# Patient Record
Sex: MALE | Race: BLACK OR AFRICAN AMERICAN | NOT HISPANIC OR LATINO | Employment: OTHER | URBAN - METROPOLITAN AREA
[De-identification: names, ages, dates, MRNs, and addresses within clinical notes are randomized per-mention and may not be internally consistent; named-entity substitution may affect disease eponyms.]

---

## 2023-05-01 ENCOUNTER — APPOINTMENT (OUTPATIENT)
Dept: PRIMARY CARE | Facility: CLINIC | Age: 73
End: 2023-05-01
Payer: MEDICARE

## 2023-05-03 ENCOUNTER — PATIENT OUTREACH (OUTPATIENT)
Dept: PRIMARY CARE | Facility: CLINIC | Age: 73
End: 2023-05-03
Payer: MEDICARE

## 2023-05-03 ENCOUNTER — DOCUMENTATION (OUTPATIENT)
Dept: PRIMARY CARE | Facility: CLINIC | Age: 73
End: 2023-05-03
Payer: MEDICARE

## 2023-05-03 NOTE — PROGRESS NOTES
Discharge Facility: [facility]  Discharge Diagnosis: UTI bacteremia due to Klebsiella oxytoca  Admission Date: 4-28-23  Discharge Date: 5-2-23    PCP Appointment Date:Unable to reach pt. At this time. Message sent to [facility]  Specialist Appointment Date: Unknown   Hospital Encounter and Summary: Linked

## 2023-05-17 ENCOUNTER — PATIENT OUTREACH (OUTPATIENT)
Dept: PRIMARY CARE | Facility: CLINIC | Age: 73
End: 2023-05-17
Payer: MEDICARE

## 2023-05-17 NOTE — PROGRESS NOTES
"TCM complete.  Discharge date \" 5-2-23   2 attempts were made to reach patient to assess needs.   No return call as of this note.   If patient schedules follow up within 14 days of discharge, visit is TCM billable.  Message sent to practice clinical pool to reach out to patient and schedule an appointment within 7-13 days from discharge date.    If patient meets criteria for moderately complex & has follow-up within 14 days-can bill [id number].   If patient meets criteria for highly complex & has follow-up visit within 7 days-can bill [id number].    *virtual follow up needs modifier added (95 or GT)   *AWV AND TCM CAN BE BILLED TOGETHER WITH 25 MODIFIER   Enrique Garcia LPN    "

## 2023-07-11 PROBLEM — H52.4 BILATERAL PRESBYOPIA: Status: ACTIVE | Noted: 2023-07-11

## 2023-07-11 PROBLEM — J18.1: Status: ACTIVE | Noted: 2023-07-11

## 2023-07-11 PROBLEM — R94.31 ABNORMAL EKG: Status: ACTIVE | Noted: 2023-07-11

## 2023-07-11 PROBLEM — R91.1 LUNG NODULE: Status: ACTIVE | Noted: 2023-07-11

## 2023-07-11 PROBLEM — I10 BENIGN ESSENTIAL HYPERTENSION: Status: ACTIVE | Noted: 2023-07-11

## 2023-07-11 PROBLEM — H52.03 HYPERMETROPIA OF BOTH EYES: Status: ACTIVE | Noted: 2023-07-11

## 2023-07-11 PROBLEM — R00.0 TACHYCARDIA: Status: ACTIVE | Noted: 2023-07-11

## 2023-07-11 PROBLEM — I09.9 RHEUMATIC HEART DISEASE: Status: ACTIVE | Noted: 2023-07-11

## 2023-07-11 PROBLEM — F32.5 DEPRESSION, MAJOR, IN REMISSION (CMS-HCC): Status: ACTIVE | Noted: 2023-07-11

## 2023-07-11 PROBLEM — N39.0 UTI (URINARY TRACT INFECTION): Status: RESOLVED | Noted: 2023-07-11 | Resolved: 2023-07-11

## 2023-07-11 PROBLEM — R30.0 DYSURIA: Status: RESOLVED | Noted: 2023-07-11 | Resolved: 2023-07-11

## 2023-07-11 PROBLEM — E66.3 OVERWEIGHT (BMI 25.0-29.9): Status: ACTIVE | Noted: 2023-07-11

## 2023-07-11 PROBLEM — F51.02 ADJUSTMENT INSOMNIA: Status: ACTIVE | Noted: 2023-07-11

## 2023-07-11 PROBLEM — H43.812 POSTERIOR VITREOUS DETACHMENT OF LEFT EYE: Status: ACTIVE | Noted: 2023-07-11

## 2023-07-11 PROBLEM — H40.001 GLAUCOMA SUSPECT OF RIGHT EYE: Status: ACTIVE | Noted: 2023-07-11

## 2023-07-11 PROBLEM — N52.9 MALE ERECTILE DISORDER: Status: ACTIVE | Noted: 2023-07-11

## 2023-07-11 PROBLEM — E78.5 HYPERLIPEMIA: Status: ACTIVE | Noted: 2023-07-11

## 2023-07-11 PROBLEM — N17.9 AKI (ACUTE KIDNEY INJURY) (CMS-HCC): Status: ACTIVE | Noted: 2023-07-11

## 2023-07-11 PROBLEM — R97.20 ELEVATED PSA: Status: ACTIVE | Noted: 2023-07-11

## 2023-07-11 PROBLEM — N28.1 COMPLEX RENAL CYST: Status: ACTIVE | Noted: 2023-07-11

## 2023-07-11 PROBLEM — H52.203 ASTIGMATISM, BILATERAL: Status: ACTIVE | Noted: 2023-07-11

## 2023-07-11 PROBLEM — R73.01 ELEVATED FASTING BLOOD SUGAR: Status: ACTIVE | Noted: 2023-07-11

## 2023-07-11 RX ORDER — METOPROLOL SUCCINATE 50 MG/1
1 TABLET, EXTENDED RELEASE ORAL DAILY
COMMUNITY
Start: 2022-03-01 | End: 2023-07-14 | Stop reason: SDUPTHER

## 2023-07-11 RX ORDER — TADALAFIL 20 MG/1
TABLET ORAL
COMMUNITY
End: 2023-07-12 | Stop reason: SDUPTHER

## 2023-07-11 RX ORDER — PENICILLIN V POTASSIUM 500 MG/1
1 TABLET, FILM COATED ORAL DAILY
COMMUNITY
End: 2023-07-14 | Stop reason: SDUPTHER

## 2023-07-11 RX ORDER — ROSUVASTATIN CALCIUM 10 MG/1
1 TABLET, COATED ORAL NIGHTLY
COMMUNITY
Start: 2021-08-18 | End: 2023-07-14 | Stop reason: SDUPTHER

## 2023-07-11 RX ORDER — HYDRALAZINE HYDROCHLORIDE 50 MG/1
1 TABLET, FILM COATED ORAL 2 TIMES DAILY
COMMUNITY
End: 2023-07-14 | Stop reason: SDUPTHER

## 2023-07-11 RX ORDER — LOSARTAN POTASSIUM 100 MG/1
1 TABLET ORAL DAILY
COMMUNITY
Start: 2018-07-24 | End: 2023-07-14 | Stop reason: SDUPTHER

## 2023-07-11 RX ORDER — TRIAMTERENE AND HYDROCHLOROTHIAZIDE 37.5; 25 MG/1; MG/1
1 CAPSULE ORAL DAILY
COMMUNITY
End: 2023-07-14 | Stop reason: SDUPTHER

## 2023-07-12 ENCOUNTER — OFFICE VISIT (OUTPATIENT)
Dept: PRIMARY CARE | Facility: CLINIC | Age: 73
End: 2023-07-12
Payer: MEDICARE

## 2023-07-12 VITALS
SYSTOLIC BLOOD PRESSURE: 111 MMHG | TEMPERATURE: 97.2 F | WEIGHT: 192.8 LBS | DIASTOLIC BLOOD PRESSURE: 77 MMHG | BODY MASS INDEX: 26.11 KG/M2 | HEIGHT: 72 IN

## 2023-07-12 DIAGNOSIS — N40.0 ENLARGED PROSTATE: ICD-10-CM

## 2023-07-12 DIAGNOSIS — I10 BENIGN ESSENTIAL HYPERTENSION: Primary | ICD-10-CM

## 2023-07-12 DIAGNOSIS — E78.2 MIXED HYPERLIPIDEMIA: ICD-10-CM

## 2023-07-12 DIAGNOSIS — I09.9 RHEUMATIC HEART DISEASE: ICD-10-CM

## 2023-07-12 DIAGNOSIS — R73.01 ELEVATED FASTING BLOOD SUGAR: ICD-10-CM

## 2023-07-12 PROBLEM — F32.5 DEPRESSION, MAJOR, IN REMISSION (CMS-HCC): Status: RESOLVED | Noted: 2023-07-11 | Resolved: 2023-07-12

## 2023-07-12 PROCEDURE — 3074F SYST BP LT 130 MM HG: CPT | Performed by: NURSE PRACTITIONER

## 2023-07-12 PROCEDURE — 99214 OFFICE O/P EST MOD 30 MIN: CPT | Performed by: NURSE PRACTITIONER

## 2023-07-12 PROCEDURE — 1036F TOBACCO NON-USER: CPT | Performed by: NURSE PRACTITIONER

## 2023-07-12 PROCEDURE — 1159F MED LIST DOCD IN RCRD: CPT | Performed by: NURSE PRACTITIONER

## 2023-07-12 PROCEDURE — 1160F RVW MEDS BY RX/DR IN RCRD: CPT | Performed by: NURSE PRACTITIONER

## 2023-07-12 PROCEDURE — 3078F DIAST BP <80 MM HG: CPT | Performed by: NURSE PRACTITIONER

## 2023-07-12 RX ORDER — TADALAFIL 20 MG/1
20 TABLET ORAL DAILY PRN
Qty: 30 TABLET | Refills: 1 | Status: SHIPPED | OUTPATIENT
Start: 2023-07-12 | End: 2024-02-09 | Stop reason: ALTCHOICE

## 2023-07-12 RX ORDER — TAMSULOSIN HYDROCHLORIDE 0.4 MG/1
0.4 CAPSULE ORAL DAILY
Qty: 30 CAPSULE | Refills: 0 | Status: SHIPPED
Start: 2023-07-12 | End: 2024-02-09 | Stop reason: ALTCHOICE

## 2023-07-12 ASSESSMENT — ENCOUNTER SYMPTOMS
CARDIOVASCULAR NEGATIVE: 1
PSYCHIATRIC NEGATIVE: 1
CONSTITUTIONAL NEGATIVE: 1
RESPIRATORY NEGATIVE: 1
GASTROINTESTINAL NEGATIVE: 1

## 2023-07-12 ASSESSMENT — PATIENT HEALTH QUESTIONNAIRE - PHQ9
2. FEELING DOWN, DEPRESSED OR HOPELESS: NOT AT ALL
1. LITTLE INTEREST OR PLEASURE IN DOING THINGS: NOT AT ALL
SUM OF ALL RESPONSES TO PHQ9 QUESTIONS 1 AND 2: 0

## 2023-07-12 NOTE — PROGRESS NOTES
Subjective   Patient ID: Franky Cai is a 72 y.o. male who presents for Follow-up.    HPI Presents today for follow up and medication renewal.  Tolerates medication well.  Was hospitalized 4/28/2023 to 5/2/2023 for urinary retention, prostatitis and sepsis.  He had noticed his urinary stream reduced and then became delusional.  Went to urgent care and then [facility].  Spent the 4 days in the hospital on Iv antibiotics.  Prostate biopsy negative per patient     Was given tamsulosin for 30 days after for stream which helped , has a follow up with urology 8/2/2023  /77 this morning.   Blood work reviewed 2022 and 2023.   Cardiology visit 3/1/2023 reviewed as noted  Requesting written prescription for Cialis today    Review of Systems   Constitutional: Negative.    Respiratory: Negative.     Cardiovascular: Negative.    Gastrointestinal: Negative.    Genitourinary:         As noted in HPI     Psychiatric/Behavioral: Negative.         Objective   BP (!) 144/92   Temp 36.2 °C (97.2 °F)   Ht 1.829 m (6')   Wt 87.5 kg (192 lb 12.8 oz)   BMI 26.15 kg/m²     Physical Exam  Constitutional:       Appearance: Normal appearance.   Cardiovascular:      Rate and Rhythm: Normal rate and regular rhythm.   Pulmonary:      Effort: Pulmonary effort is normal.      Breath sounds: Normal breath sounds.   Abdominal:      General: Abdomen is flat. Bowel sounds are normal.      Palpations: Abdomen is soft.   Musculoskeletal:         General: Normal range of motion.   Neurological:      General: No focal deficit present.      Mental Status: He is alert.   Psychiatric:         Mood and Affect: Mood normal.         Behavior: Behavior normal.         Assessment/Plan   Problem List Items Addressed This Visit          Cardiac and Vasculature    Benign essential hypertension - Primary    Relevant Orders    Comprehensive Metabolic Panel    CBC and Auto Differential    Hyperlipemia    Relevant Orders    Lipid Panel    Rheumatic heart  disease       Endocrine/Metabolic    Elevated fasting blood sugar    Relevant Orders    Hemoglobin A1C     Other Visit Diagnoses       Enlarged prostate        Relevant Medications    tamsulosin (Flomax) 0.4 mg 24 hr capsule    tadalafil 20 mg tablet    Other Relevant Orders    Prostate Specific Antigen, Screen

## 2023-07-12 NOTE — PATIENT INSTRUCTIONS
I will refill medications after the blood work.   I restart tamsulosin, follow up with urology next month.   Follow up in 6 months and as needed

## 2023-07-14 ENCOUNTER — LAB (OUTPATIENT)
Dept: LAB | Facility: LAB | Age: 73
End: 2023-07-14
Payer: MEDICARE

## 2023-07-14 DIAGNOSIS — I10 BENIGN ESSENTIAL HYPERTENSION: Primary | ICD-10-CM

## 2023-07-14 DIAGNOSIS — I10 BENIGN ESSENTIAL HYPERTENSION: ICD-10-CM

## 2023-07-14 DIAGNOSIS — N40.0 ENLARGED PROSTATE: ICD-10-CM

## 2023-07-14 DIAGNOSIS — I09.9 RHEUMATIC HEART DISEASE: ICD-10-CM

## 2023-07-14 DIAGNOSIS — R73.01 ELEVATED FASTING BLOOD SUGAR: ICD-10-CM

## 2023-07-14 DIAGNOSIS — E78.2 MIXED HYPERLIPIDEMIA: ICD-10-CM

## 2023-07-14 LAB
ALANINE AMINOTRANSFERASE (SGPT) (U/L) IN SER/PLAS: 18 U/L (ref 10–52)
ALBUMIN (G/DL) IN SER/PLAS: 4.1 G/DL (ref 3.4–5)
ALKALINE PHOSPHATASE (U/L) IN SER/PLAS: 62 U/L (ref 33–136)
ANION GAP IN SER/PLAS: 11 MMOL/L (ref 10–20)
ASPARTATE AMINOTRANSFERASE (SGOT) (U/L) IN SER/PLAS: 18 U/L (ref 9–39)
BASOPHILS (10*3/UL) IN BLOOD BY AUTOMATED COUNT: 0.05 X10E9/L (ref 0–0.1)
BASOPHILS/100 LEUKOCYTES IN BLOOD BY AUTOMATED COUNT: 0.5 % (ref 0–2)
BILIRUBIN TOTAL (MG/DL) IN SER/PLAS: 0.6 MG/DL (ref 0–1.2)
CALCIUM (MG/DL) IN SER/PLAS: 10 MG/DL (ref 8.6–10.3)
CARBON DIOXIDE, TOTAL (MMOL/L) IN SER/PLAS: 26 MMOL/L (ref 21–32)
CHLORIDE (MMOL/L) IN SER/PLAS: 105 MMOL/L (ref 98–107)
CHOLESTEROL (MG/DL) IN SER/PLAS: 182 MG/DL (ref 0–199)
CHOLESTEROL IN HDL (MG/DL) IN SER/PLAS: 46.9 MG/DL
CHOLESTEROL/HDL RATIO: 3.9
CREATININE (MG/DL) IN SER/PLAS: 1.49 MG/DL (ref 0.5–1.3)
EOSINOPHILS (10*3/UL) IN BLOOD BY AUTOMATED COUNT: 0.28 X10E9/L (ref 0–0.4)
EOSINOPHILS/100 LEUKOCYTES IN BLOOD BY AUTOMATED COUNT: 3 % (ref 0–6)
ERYTHROCYTE DISTRIBUTION WIDTH (RATIO) BY AUTOMATED COUNT: 16 % (ref 11.5–14.5)
ERYTHROCYTE MEAN CORPUSCULAR HEMOGLOBIN CONCENTRATION (G/DL) BY AUTOMATED: 31.9 G/DL (ref 32–36)
ERYTHROCYTE MEAN CORPUSCULAR VOLUME (FL) BY AUTOMATED COUNT: 87 FL (ref 80–100)
ERYTHROCYTES (10*6/UL) IN BLOOD BY AUTOMATED COUNT: 5.49 X10E12/L (ref 4.5–5.9)
ESTIMATED AVERAGE GLUCOSE FOR HBA1C: 134 MG/DL
GFR MALE: 50 ML/MIN/1.73M2
GLUCOSE (MG/DL) IN SER/PLAS: 100 MG/DL (ref 74–99)
HEMATOCRIT (%) IN BLOOD BY AUTOMATED COUNT: 47.7 % (ref 41–52)
HEMOGLOBIN (G/DL) IN BLOOD: 15.2 G/DL (ref 13.5–17.5)
HEMOGLOBIN A1C/HEMOGLOBIN TOTAL IN BLOOD: 6.3 %
IMMATURE GRANULOCYTES/100 LEUKOCYTES IN BLOOD BY AUTOMATED COUNT: 0.2 % (ref 0–0.9)
LDL: 83 MG/DL (ref 0–99)
LEUKOCYTES (10*3/UL) IN BLOOD BY AUTOMATED COUNT: 9.4 X10E9/L (ref 4.4–11.3)
LYMPHOCYTES (10*3/UL) IN BLOOD BY AUTOMATED COUNT: 4.07 X10E9/L (ref 0.8–3)
LYMPHOCYTES/100 LEUKOCYTES IN BLOOD BY AUTOMATED COUNT: 43.1 % (ref 13–44)
MONOCYTES (10*3/UL) IN BLOOD BY AUTOMATED COUNT: 0.79 X10E9/L (ref 0.05–0.8)
MONOCYTES/100 LEUKOCYTES IN BLOOD BY AUTOMATED COUNT: 8.4 % (ref 2–10)
NEUTROPHILS (10*3/UL) IN BLOOD BY AUTOMATED COUNT: 4.23 X10E9/L (ref 1.6–5.5)
NEUTROPHILS/100 LEUKOCYTES IN BLOOD BY AUTOMATED COUNT: 44.8 % (ref 40–80)
NON HDL CHOLESTEROL: 135 MG/DL
PLATELETS (10*3/UL) IN BLOOD AUTOMATED COUNT: 316 X10E9/L (ref 150–450)
POTASSIUM (MMOL/L) IN SER/PLAS: 3.8 MMOL/L (ref 3.5–5.3)
PROSTATE SPECIFIC ANTIGEN,SCREEN: 35.16 NG/ML (ref 0–4)
PROTEIN TOTAL: 7.3 G/DL (ref 6.4–8.2)
SODIUM (MMOL/L) IN SER/PLAS: 138 MMOL/L (ref 136–145)
TRIGLYCERIDE (MG/DL) IN SER/PLAS: 262 MG/DL (ref 0–149)
UREA NITROGEN (MG/DL) IN SER/PLAS: 31 MG/DL (ref 6–23)
VLDL: 52 MG/DL (ref 0–40)

## 2023-07-14 PROCEDURE — 80061 LIPID PANEL: CPT

## 2023-07-14 PROCEDURE — 85025 COMPLETE CBC W/AUTO DIFF WBC: CPT

## 2023-07-14 PROCEDURE — 84153 ASSAY OF PSA TOTAL: CPT

## 2023-07-14 PROCEDURE — 83036 HEMOGLOBIN GLYCOSYLATED A1C: CPT

## 2023-07-14 PROCEDURE — 36415 COLL VENOUS BLD VENIPUNCTURE: CPT

## 2023-07-14 PROCEDURE — 80053 COMPREHEN METABOLIC PANEL: CPT

## 2023-07-14 RX ORDER — HYDRALAZINE HYDROCHLORIDE 50 MG/1
50 TABLET, FILM COATED ORAL 2 TIMES DAILY
Qty: 90 TABLET | Refills: 1 | Status: SHIPPED | OUTPATIENT
Start: 2023-07-14 | End: 2023-08-01 | Stop reason: SDUPTHER

## 2023-07-14 RX ORDER — METOPROLOL SUCCINATE 50 MG/1
50 TABLET, EXTENDED RELEASE ORAL DAILY
Qty: 90 TABLET | Refills: 1 | Status: SHIPPED | OUTPATIENT
Start: 2023-07-14 | End: 2023-08-01 | Stop reason: SDUPTHER

## 2023-07-14 RX ORDER — LOSARTAN POTASSIUM 100 MG/1
100 TABLET ORAL DAILY
Qty: 90 TABLET | Refills: 1 | Status: SHIPPED | OUTPATIENT
Start: 2023-07-14 | End: 2023-08-01 | Stop reason: SDUPTHER

## 2023-07-14 RX ORDER — ROSUVASTATIN CALCIUM 10 MG/1
10 TABLET, COATED ORAL NIGHTLY
Qty: 90 TABLET | Refills: 3 | Status: SHIPPED | OUTPATIENT
Start: 2023-07-14 | End: 2023-08-01 | Stop reason: SDUPTHER

## 2023-07-14 RX ORDER — PENICILLIN V POTASSIUM 500 MG/1
500 TABLET, FILM COATED ORAL DAILY
Qty: 90 TABLET | Refills: 1 | Status: SHIPPED | OUTPATIENT
Start: 2023-07-14 | End: 2023-08-01 | Stop reason: SDUPTHER

## 2023-07-14 RX ORDER — TRIAMTERENE AND HYDROCHLOROTHIAZIDE 37.5; 25 MG/1; MG/1
1 CAPSULE ORAL DAILY
Qty: 90 CAPSULE | Refills: 1 | Status: SHIPPED | OUTPATIENT
Start: 2023-07-14 | End: 2023-08-01 | Stop reason: SDUPTHER

## 2023-07-17 ENCOUNTER — TELEPHONE (OUTPATIENT)
Dept: PRIMARY CARE | Facility: CLINIC | Age: 73
End: 2023-07-17
Payer: MEDICARE

## 2023-07-17 NOTE — TELEPHONE ENCOUNTER
----- Message from ANNA Gonzalez sent at 7/14/2023  2:16 PM EDT -----  Regarding: Blood work  Let know blood work looks ok.  Blood sugar is up just a bit and PSA is 35.  I now he has an appointment with urology in August so Dr DONALDSON will address the PSA.  Please work on limiting simple sugars in your diet to reduce the sugar.  WG  ----- Message -----  From: Lab, Background User  Sent: 7/14/2023  12:18 PM EDT  To: ANNA Gonzalez

## 2023-07-28 DIAGNOSIS — E78.2 MIXED HYPERLIPIDEMIA: ICD-10-CM

## 2023-07-28 DIAGNOSIS — I10 BENIGN ESSENTIAL HYPERTENSION: ICD-10-CM

## 2023-07-28 DIAGNOSIS — I09.9 RHEUMATIC HEART DISEASE: ICD-10-CM

## 2023-07-31 RX ORDER — ROSUVASTATIN CALCIUM 10 MG/1
10 TABLET, COATED ORAL NIGHTLY
Qty: 90 TABLET | Refills: 1 | OUTPATIENT
Start: 2023-07-31

## 2023-07-31 RX ORDER — TRIAMTERENE AND HYDROCHLOROTHIAZIDE 37.5; 25 MG/1; MG/1
1 CAPSULE ORAL DAILY
Qty: 90 CAPSULE | Refills: 1 | OUTPATIENT
Start: 2023-07-31

## 2023-07-31 RX ORDER — HYDRALAZINE HYDROCHLORIDE 50 MG/1
50 TABLET, FILM COATED ORAL 2 TIMES DAILY
Qty: 180 TABLET | Refills: 1 | OUTPATIENT
Start: 2023-07-31

## 2023-07-31 RX ORDER — LOSARTAN POTASSIUM 100 MG/1
100 TABLET ORAL DAILY
Qty: 90 TABLET | Refills: 1 | OUTPATIENT
Start: 2023-07-31

## 2023-07-31 NOTE — TELEPHONE ENCOUNTER
Called pt 7/31/23 and left VM to call the pharmacy because he should have enough medication with a refill.

## 2023-08-01 RX ORDER — TRIAMTERENE AND HYDROCHLOROTHIAZIDE 37.5; 25 MG/1; MG/1
1 CAPSULE ORAL DAILY
Qty: 90 CAPSULE | Refills: 1 | Status: SHIPPED | OUTPATIENT
Start: 2023-08-01 | End: 2024-01-29 | Stop reason: SDUPTHER

## 2023-08-01 RX ORDER — HYDRALAZINE HYDROCHLORIDE 50 MG/1
50 TABLET, FILM COATED ORAL 2 TIMES DAILY
Qty: 90 TABLET | Refills: 1 | Status: SHIPPED | OUTPATIENT
Start: 2023-08-01 | End: 2023-12-11

## 2023-08-01 RX ORDER — PENICILLIN V POTASSIUM 500 MG/1
500 TABLET, FILM COATED ORAL DAILY
Qty: 90 TABLET | Refills: 1 | Status: SHIPPED
Start: 2023-08-01 | End: 2024-02-09 | Stop reason: ALTCHOICE

## 2023-08-01 RX ORDER — METOPROLOL SUCCINATE 50 MG/1
50 TABLET, EXTENDED RELEASE ORAL DAILY
Qty: 90 TABLET | Refills: 1 | Status: SHIPPED | OUTPATIENT
Start: 2023-08-01 | End: 2024-02-06

## 2023-08-01 RX ORDER — ROSUVASTATIN CALCIUM 10 MG/1
10 TABLET, COATED ORAL NIGHTLY
Qty: 90 TABLET | Refills: 3 | Status: SHIPPED | OUTPATIENT
Start: 2023-08-01 | End: 2024-06-05

## 2023-08-01 RX ORDER — LOSARTAN POTASSIUM 100 MG/1
100 TABLET ORAL DAILY
Qty: 90 TABLET | Refills: 1 | Status: SHIPPED | OUTPATIENT
Start: 2023-08-01 | End: 2024-02-06

## 2023-08-03 LAB
CREATININE (MG/DL) IN SER/PLAS: 1.76 MG/DL (ref 0.5–1.3)
GFR MALE: 41 ML/MIN/1.73M2
PROSTATE SPECIFIC AG (NG/ML) IN SER/PLAS: 33.96 NG/ML (ref 0–4)

## 2023-10-18 ENCOUNTER — APPOINTMENT (OUTPATIENT)
Dept: UROLOGY | Facility: CLINIC | Age: 73
End: 2023-10-18
Payer: MEDICARE

## 2023-10-20 ENCOUNTER — APPOINTMENT (OUTPATIENT)
Dept: UROLOGY | Facility: CLINIC | Age: 73
End: 2023-10-20
Payer: MEDICARE

## 2023-11-21 ENCOUNTER — OFFICE VISIT (OUTPATIENT)
Dept: UROLOGY | Facility: CLINIC | Age: 73
End: 2023-11-21
Payer: MEDICARE

## 2023-11-21 VITALS — WEIGHT: 197.6 LBS | HEIGHT: 72 IN | BODY MASS INDEX: 26.76 KG/M2 | TEMPERATURE: 95.5 F

## 2023-11-21 DIAGNOSIS — N39.0 RECURRENT UTI: ICD-10-CM

## 2023-11-21 DIAGNOSIS — R97.20 ELEVATED PSA: ICD-10-CM

## 2023-11-21 DIAGNOSIS — N28.89 RENAL MASS: ICD-10-CM

## 2023-11-21 LAB
POC APPEARANCE, URINE: CLEAR
POC BILIRUBIN, URINE: ABNORMAL
POC BLOOD, URINE: NEGATIVE
POC COLOR, URINE: YELLOW
POC GLUCOSE, URINE: NEGATIVE MG/DL
POC KETONES, URINE: ABNORMAL MG/DL
POC LEUKOCYTES, URINE: NEGATIVE
POC NITRITE,URINE: NEGATIVE
POC PH, URINE: 5.5 PH
POC PROTEIN, URINE: ABNORMAL MG/DL
POC SPECIFIC GRAVITY, URINE: 1.02
POC UROBILINOGEN, URINE: 0.2 EU/DL

## 2023-11-21 PROCEDURE — 1036F TOBACCO NON-USER: CPT | Performed by: UROLOGY

## 2023-11-21 PROCEDURE — 51798 US URINE CAPACITY MEASURE: CPT | Performed by: UROLOGY

## 2023-11-21 PROCEDURE — 1159F MED LIST DOCD IN RCRD: CPT | Performed by: UROLOGY

## 2023-11-21 PROCEDURE — 1160F RVW MEDS BY RX/DR IN RCRD: CPT | Performed by: UROLOGY

## 2023-11-21 PROCEDURE — 81003 URINALYSIS AUTO W/O SCOPE: CPT | Performed by: UROLOGY

## 2023-11-21 PROCEDURE — 1126F AMNT PAIN NOTED NONE PRSNT: CPT | Performed by: UROLOGY

## 2023-11-21 PROCEDURE — 99204 OFFICE O/P NEW MOD 45 MIN: CPT | Performed by: UROLOGY

## 2023-11-21 ASSESSMENT — PAIN SCALES - GENERAL: PAINLEVEL: 0-NO PAIN

## 2023-11-21 NOTE — PROGRESS NOTES
Subjective   Franky Cai is a 72 y.o. male who presents as a new patient kindly referred by Dr. Arreguin due to elevated PSA s/p negative prostate biopsy. Patient was admitted to [facility] 5/2023 due to UTI secondary to klebsiella and bacteremia. PSA aden significantly on admission, has come back was 33.96 on 8/2023. He had a renal scan done which showed right-sided renal mass.  He has nocturia x1. No other bothersome urinary symptoms.  He denies flank pain, gross hematuria, kidney stones, and recurrent UTI.    Objective   Past Medical History:   Diagnosis Date    Acne, unspecified 02/19/2013    Acne    Cutaneous abscess of face 02/19/2013    Abscess of face    Inflammatory disease of prostate, unspecified 02/19/2013    Prostatitis    Other conditions influencing health status 02/19/2013    Infective Myositis    Other conditions influencing health status 02/19/2013    Familial Combined Hyperlipidemia    Rheumatic heart disease, unspecified 07/19/2022    Rheumatic heart disease    UTI (urinary tract infection) 07/11/2023     Past Surgical History:   Procedure Laterality Date    OTHER SURGICAL HISTORY  09/16/2022    No history of surgery     Current Outpatient Medications on File Prior to Visit   Medication Sig Dispense Refill    aspirin 81 mg capsule Take 1 tablet by mouth once daily.      hydrALAZINE (Apresoline) 50 mg tablet Take 1 tablet (50 mg) by mouth 2 times a day. 90 tablet 1    losartan (Cozaar) 100 mg tablet Take 1 tablet (100 mg) by mouth once daily. 90 tablet 1    metoprolol succinate XL (Toprol-XL) 50 mg 24 hr tablet Take 1 tablet (50 mg) by mouth once daily. 90 tablet 1    rosuvastatin (Crestor) 10 mg tablet Take 1 tablet (10 mg) by mouth once daily at bedtime. 90 tablet 3    penicillin v potassium (Veetid) 500 mg tablet Take 1 tablet (500 mg) by mouth once daily. (Patient not taking: Reported on 11/21/2023) 90 tablet 1    tadalafil 20 mg tablet Take 1 tablet (20 mg) by mouth once daily  as needed for erectile dysfunction. (Patient not taking: Reported on 11/21/2023) 30 tablet 1    tamsulosin (Flomax) 0.4 mg 24 hr capsule Take 1 capsule (0.4 mg) by mouth once daily. (Patient not taking: Reported on 11/21/2023) 30 capsule 0    triamterene-hydrochlorothiazid (Dyazide) 37.5-25 mg capsule Take 1 capsule by mouth once daily. (Patient not taking: Reported on 11/21/2023) 90 capsule 1     No current facility-administered medications on file prior to visit.     No Known Allergies    Temp 35.3 °C (95.5 °F)   Ht 1.829 m (6')   Wt 89.6 kg (197 lb 9.6 oz)   BMI 26.80 kg/m²   Physical Exam    Lab Review  Urine analysis shows negative   Lab Results   Component Value Date    PSA 33.96 (H) 08/03/2023     PVR 0ml  IPSS 3 and 1    Assessment/Plan   Diagnoses and all orders for this visit:  Renal mass  -     POCT UA Automated manually resulted  Recurrent UTI  -     Measure post void residual  Elevated PSA    Renal Mass     I reviewed CT of kidneys from 8/1/2023 which shows a solid enhancing 2 cm lesion upper portion left kidney is  compatible with neoplasm, indeterminate subtype.Multiple additional renal lesions more likely benign.       We discussed treatment options for enhancing renal mass which include renal biopsy vs. percutaneous ablation vs. partial nephrectomy vs. radical nephrectomy.      Patient will follow up with Dr. Mccormick for further management and evaluation.     2. UTI     Resolved, patient had no prior episodes to hospitalization. He has no bothersome urinary symptoms. PVR 0    We will continue to monitor.     3. Elevated PSA s/p negative biopsy     We will proceed with prostate MRI under sedation due to patients claustrophobia.       All questions were answered to the patient's satisfaction. Patient agrees with the plan and wishes to proceed. Follow-up will be scheduled appropriately.     Scribed for Dr. Watters by Mackenzie Neely. I , Dr Watters, have personally reviewed and agreed with the information  entered by the Virtual Scribe.

## 2023-12-06 PROBLEM — Q23.81 BICUSPID AORTIC VALVE: Status: ACTIVE | Noted: 2023-12-06

## 2023-12-06 PROBLEM — N28.89 RENAL MASS: Status: ACTIVE | Noted: 2023-12-06

## 2023-12-06 PROBLEM — Q23.1 BICUSPID AORTIC VALVE (HHS-HCC): Status: ACTIVE | Noted: 2023-12-06

## 2023-12-06 PROBLEM — I47.10 SUPRAVENTRICULAR TACHYCARDIA (CMS-HCC): Status: ACTIVE | Noted: 2023-12-06

## 2023-12-08 ENCOUNTER — TELEPHONE (OUTPATIENT)
Dept: PRIMARY CARE | Facility: CLINIC | Age: 73
End: 2023-12-08
Payer: MEDICARE

## 2023-12-08 DIAGNOSIS — I10 BENIGN ESSENTIAL HYPERTENSION: ICD-10-CM

## 2023-12-08 NOTE — TELEPHONE ENCOUNTER
SEE'S JASON DRAKE-CNP,   PT CALLED TO STATE THAT HE HAS TESTED HIMSELF 2 TIMES THIS MORNING AND EACH TIME  TESTED POSTIVE, PER PT HE IS HAVING DIARRHEA, AND HAS NO APPETITE, IS HAVING NO RESPIRATORY ISSUES AT THIS TIME.    PT CALLING ASKING IS THERE SOMETHING HE CAN TAKE.    NKDA    PLEASE REVIEW AND ADVISE

## 2023-12-11 ENCOUNTER — APPOINTMENT (OUTPATIENT)
Dept: OPHTHALMOLOGY | Facility: CLINIC | Age: 73
End: 2023-12-11
Payer: MEDICARE

## 2023-12-11 RX ORDER — HYDRALAZINE HYDROCHLORIDE 50 MG/1
50 TABLET, FILM COATED ORAL 2 TIMES DAILY
Qty: 180 TABLET | Refills: 1 | Status: SHIPPED | OUTPATIENT
Start: 2023-12-11 | End: 2024-02-09 | Stop reason: SDUPTHER

## 2023-12-29 ENCOUNTER — APPOINTMENT (OUTPATIENT)
Dept: RADIOLOGY | Facility: HOSPITAL | Age: 73
End: 2023-12-29
Payer: MEDICARE

## 2024-01-29 DIAGNOSIS — I10 BENIGN ESSENTIAL HYPERTENSION: ICD-10-CM

## 2024-01-29 RX ORDER — TRIAMTERENE AND HYDROCHLOROTHIAZIDE 37.5; 25 MG/1; MG/1
1 CAPSULE ORAL DAILY
Qty: 90 CAPSULE | Refills: 1 | Status: SHIPPED | OUTPATIENT
Start: 2024-01-29 | End: 2024-02-09 | Stop reason: SDUPTHER

## 2024-02-05 ENCOUNTER — APPOINTMENT (OUTPATIENT)
Dept: RADIOLOGY | Facility: HOSPITAL | Age: 74
End: 2024-02-05
Payer: MEDICARE

## 2024-02-06 DIAGNOSIS — I10 BENIGN ESSENTIAL HYPERTENSION: ICD-10-CM

## 2024-02-06 RX ORDER — METOPROLOL SUCCINATE 50 MG/1
50 TABLET, EXTENDED RELEASE ORAL DAILY
Qty: 90 TABLET | Refills: 0 | Status: SHIPPED | OUTPATIENT
Start: 2024-02-06 | End: 2024-02-09 | Stop reason: SDUPTHER

## 2024-02-06 RX ORDER — LOSARTAN POTASSIUM 100 MG/1
100 TABLET ORAL DAILY
Qty: 90 TABLET | Refills: 1 | Status: SHIPPED | OUTPATIENT
Start: 2024-02-06 | End: 2024-02-09 | Stop reason: SDUPTHER

## 2024-02-07 ENCOUNTER — APPOINTMENT (OUTPATIENT)
Dept: PRIMARY CARE | Facility: CLINIC | Age: 74
End: 2024-02-07
Payer: MEDICARE

## 2024-02-09 ENCOUNTER — OFFICE VISIT (OUTPATIENT)
Dept: PRIMARY CARE | Facility: CLINIC | Age: 74
End: 2024-02-09
Payer: MEDICARE

## 2024-02-09 VITALS
HEIGHT: 73 IN | DIASTOLIC BLOOD PRESSURE: 80 MMHG | BODY MASS INDEX: 26.98 KG/M2 | TEMPERATURE: 97.7 F | HEART RATE: 86 BPM | WEIGHT: 203.6 LBS | SYSTOLIC BLOOD PRESSURE: 120 MMHG | OXYGEN SATURATION: 95 %

## 2024-02-09 DIAGNOSIS — E66.3 OVERWEIGHT (BMI 25.0-29.9): ICD-10-CM

## 2024-02-09 DIAGNOSIS — R73.01 ELEVATED FASTING BLOOD SUGAR: ICD-10-CM

## 2024-02-09 DIAGNOSIS — I10 BENIGN ESSENTIAL HYPERTENSION: ICD-10-CM

## 2024-02-09 DIAGNOSIS — Z00.00 MEDICARE ANNUAL WELLNESS VISIT, SUBSEQUENT: Primary | ICD-10-CM

## 2024-02-09 DIAGNOSIS — Z00.00 HEALTH CARE MAINTENANCE: ICD-10-CM

## 2024-02-09 DIAGNOSIS — E78.2 MIXED HYPERLIPIDEMIA: ICD-10-CM

## 2024-02-09 LAB — POC HEMOGLOBIN A1C: 6.3 % (ref 4.2–6.5)

## 2024-02-09 PROCEDURE — 3074F SYST BP LT 130 MM HG: CPT | Performed by: NURSE PRACTITIONER

## 2024-02-09 PROCEDURE — 1170F FXNL STATUS ASSESSED: CPT | Performed by: NURSE PRACTITIONER

## 2024-02-09 PROCEDURE — 83036 HEMOGLOBIN GLYCOSYLATED A1C: CPT | Performed by: NURSE PRACTITIONER

## 2024-02-09 PROCEDURE — 1126F AMNT PAIN NOTED NONE PRSNT: CPT | Performed by: NURSE PRACTITIONER

## 2024-02-09 PROCEDURE — 99214 OFFICE O/P EST MOD 30 MIN: CPT | Performed by: NURSE PRACTITIONER

## 2024-02-09 PROCEDURE — 1159F MED LIST DOCD IN RCRD: CPT | Performed by: NURSE PRACTITIONER

## 2024-02-09 PROCEDURE — 3079F DIAST BP 80-89 MM HG: CPT | Performed by: NURSE PRACTITIONER

## 2024-02-09 PROCEDURE — 1160F RVW MEDS BY RX/DR IN RCRD: CPT | Performed by: NURSE PRACTITIONER

## 2024-02-09 PROCEDURE — G0439 PPPS, SUBSEQ VISIT: HCPCS | Performed by: NURSE PRACTITIONER

## 2024-02-09 PROCEDURE — 1036F TOBACCO NON-USER: CPT | Performed by: NURSE PRACTITIONER

## 2024-02-09 PROCEDURE — 99397 PER PM REEVAL EST PAT 65+ YR: CPT | Performed by: NURSE PRACTITIONER

## 2024-02-09 RX ORDER — TRIAMTERENE AND HYDROCHLOROTHIAZIDE 37.5; 25 MG/1; MG/1
1 CAPSULE ORAL DAILY
Qty: 90 CAPSULE | Refills: 1 | Status: SHIPPED | OUTPATIENT
Start: 2024-02-09

## 2024-02-09 RX ORDER — HYDRALAZINE HYDROCHLORIDE 50 MG/1
50 TABLET, FILM COATED ORAL 2 TIMES DAILY
Qty: 180 TABLET | Refills: 1 | Status: SHIPPED | OUTPATIENT
Start: 2024-02-09

## 2024-02-09 RX ORDER — METOPROLOL SUCCINATE 50 MG/1
50 TABLET, EXTENDED RELEASE ORAL DAILY
Qty: 90 TABLET | Refills: 1 | Status: SHIPPED | OUTPATIENT
Start: 2024-02-09 | End: 2024-03-07 | Stop reason: SDUPTHER

## 2024-02-09 RX ORDER — LOSARTAN POTASSIUM 100 MG/1
100 TABLET ORAL DAILY
Qty: 90 TABLET | Refills: 1 | Status: SHIPPED | OUTPATIENT
Start: 2024-02-09

## 2024-02-09 ASSESSMENT — ENCOUNTER SYMPTOMS
GASTROINTESTINAL NEGATIVE: 1
RESPIRATORY NEGATIVE: 1
ENDOCRINE NEGATIVE: 1
CARDIOVASCULAR NEGATIVE: 1
CONSTITUTIONAL NEGATIVE: 1
EYES NEGATIVE: 1
PSYCHIATRIC NEGATIVE: 1
MUSCULOSKELETAL NEGATIVE: 1
NEUROLOGICAL NEGATIVE: 1
HEMATOLOGIC/LYMPHATIC NEGATIVE: 1

## 2024-02-09 ASSESSMENT — ACTIVITIES OF DAILY LIVING (ADL)
GROCERY_SHOPPING: INDEPENDENT
MANAGING_FINANCES: INDEPENDENT
BATHING: INDEPENDENT
DOING_HOUSEWORK: INDEPENDENT
DRESSING: INDEPENDENT
TAKING_MEDICATION: INDEPENDENT

## 2024-02-09 NOTE — PATIENT INSTRUCTIONS
Medications renewed.   Your Hba1c is 6.3 and staying steady  Please continue to work on adding exercise to your diet and limiting simple sugars in your diet.  Follow up in 6 months and as needed

## 2024-02-09 NOTE — PROGRESS NOTES
"Subjective   Reason for Visit: Franky Cai is an 73 y.o. male here for a Medicare Wellness visit.          Reviewed all medications by prescribing practitioner or clinical pharmacist (such as prescriptions, OTCs, herbal therapies and supplements) and documented in the medical record.    HPI  Presents today for Medicare wellness, Health maintenance and follow up  MRI on the 5th of March for prostate check seeing urology, off urology medications because he was not having any symptoms.  See the dentist every 6 months, brushes and flosses.   Eye exam yearly  Immunization as noted, did have COVID booster and flu this year  Colguard 4/2022. negative  Seeing cardiologist yearly have discontinued his PCN  Had COVID recently but has recovered completely  Blood work reviewed 7/25/2023 as noted    Patient Care Team:  ANNA Gonzalez as PCP - General  ANNA Gonzalez as PCP - [employer] PCP     Review of Systems   Constitutional: Negative.    HENT: Negative.     Eyes: Negative.    Respiratory: Negative.     Cardiovascular: Negative.    Gastrointestinal: Negative.    Endocrine: Negative.    Genitourinary:         As noted in HPI     Musculoskeletal: Negative.    Neurological: Negative.    Hematological: Negative.    Psychiatric/Behavioral: Negative.         Objective   Vitals  /80 (BP Location: Right arm, Patient Position: Sitting, BP Cuff Size: Large adult)   Pulse 86   Temp 36.5 °C (97.7 °F) (Temporal)   Ht 1.845 m (6' 0.64\")   Wt 92.4 kg (203 lb 9.6 oz)   SpO2 95%   BMI 27.13 kg/m²       Physical Exam  Constitutional:       Appearance: Normal appearance.   HENT:      Right Ear: Tympanic membrane, ear canal and external ear normal.      Left Ear: Tympanic membrane, ear canal and external ear normal.      Nose: Nose normal.      Mouth/Throat:      Mouth: Mucous membranes are moist.      Pharynx: Oropharynx is clear.   Eyes:      Pupils: Pupils are equal, round, and reactive to " light.   Cardiovascular:      Rate and Rhythm: Normal rate and regular rhythm.      Pulses: Normal pulses.   Pulmonary:      Effort: Pulmonary effort is normal.   Abdominal:      General: Abdomen is flat. Bowel sounds are normal.      Palpations: Abdomen is soft.   Musculoskeletal:         General: Normal range of motion.      Cervical back: Normal range of motion.   Lymphadenopathy:      Cervical: No cervical adenopathy.   Skin:     General: Skin is warm.   Neurological:      General: No focal deficit present.      Mental Status: He is alert and oriented to person, place, and time.   Psychiatric:         Mood and Affect: Mood normal.         Behavior: Behavior normal.         Assessment/Plan   Problem List Items Addressed This Visit    None

## 2024-03-07 ENCOUNTER — OFFICE VISIT (OUTPATIENT)
Dept: CARDIOLOGY | Facility: CLINIC | Age: 74
End: 2024-03-07
Payer: MEDICARE

## 2024-03-07 VITALS
BODY MASS INDEX: 26.97 KG/M2 | HEART RATE: 112 BPM | HEIGHT: 72 IN | DIASTOLIC BLOOD PRESSURE: 81 MMHG | WEIGHT: 199.1 LBS | SYSTOLIC BLOOD PRESSURE: 131 MMHG

## 2024-03-07 DIAGNOSIS — I09.9 RHEUMATIC HEART DISEASE: ICD-10-CM

## 2024-03-07 DIAGNOSIS — Q23.1 AORTIC STENOSIS DUE TO BICUSPID AORTIC VALVE (HHS-HCC): ICD-10-CM

## 2024-03-07 DIAGNOSIS — I47.10 SVT (SUPRAVENTRICULAR TACHYCARDIA) (CMS-HCC): Primary | ICD-10-CM

## 2024-03-07 DIAGNOSIS — Q23.0 AORTIC STENOSIS DUE TO BICUSPID AORTIC VALVE (HHS-HCC): ICD-10-CM

## 2024-03-07 DIAGNOSIS — I10 BENIGN ESSENTIAL HYPERTENSION: ICD-10-CM

## 2024-03-07 PROBLEM — Q23.81 AORTIC STENOSIS DUE TO BICUSPID AORTIC VALVE: Status: ACTIVE | Noted: 2024-03-07

## 2024-03-07 PROBLEM — Q23.81 BICUSPID AORTIC VALVE: Status: RESOLVED | Noted: 2023-12-06 | Resolved: 2024-03-07

## 2024-03-07 PROBLEM — I35.0 AORTIC STENOSIS DUE TO BICUSPID AORTIC VALVE: Status: ACTIVE | Noted: 2024-03-07

## 2024-03-07 PROCEDURE — 3079F DIAST BP 80-89 MM HG: CPT | Performed by: NURSE PRACTITIONER

## 2024-03-07 PROCEDURE — 1159F MED LIST DOCD IN RCRD: CPT | Performed by: NURSE PRACTITIONER

## 2024-03-07 PROCEDURE — 1160F RVW MEDS BY RX/DR IN RCRD: CPT | Performed by: NURSE PRACTITIONER

## 2024-03-07 PROCEDURE — 3075F SYST BP GE 130 - 139MM HG: CPT | Performed by: NURSE PRACTITIONER

## 2024-03-07 PROCEDURE — 1126F AMNT PAIN NOTED NONE PRSNT: CPT | Performed by: NURSE PRACTITIONER

## 2024-03-07 PROCEDURE — 99214 OFFICE O/P EST MOD 30 MIN: CPT | Performed by: NURSE PRACTITIONER

## 2024-03-07 PROCEDURE — 1036F TOBACCO NON-USER: CPT | Performed by: NURSE PRACTITIONER

## 2024-03-07 RX ORDER — METOPROLOL SUCCINATE 50 MG/1
50 TABLET, EXTENDED RELEASE ORAL DAILY
Qty: 90 TABLET | Refills: 3 | Status: SHIPPED | OUTPATIENT
Start: 2024-03-07

## 2024-03-07 NOTE — PROGRESS NOTES
Chief Complaint:   SVT and bicuspid aortic valve      History Of Present Illness:    Franky Cai is a 73 y.o. male here with SVT bicuspid aortic valve .    Denies palpitations.     When exercises he has increased SOB. This remains unchanged from last year.     Dizzy and lightheaded on occasion. But relatively rare.     He denies LE edema. Denies CP.       Holter (Rare PVCs, Rare PACs, Nonsustained SVT) - 2/14/2022  Allergies:  Patient has no known allergies.    Review of Systems  All pertinent systems have been reviewed and are negative except for what is stated in the history of present illness.    All other systems have been reviewed and are negative and noncontributory to this patient's current ailments.     Visit Vitals  /81 (BP Location: Right arm, Patient Position: Sitting, BP Cuff Size: Large adult)   Pulse (!) 112   Ht 1.829 m (6')   Wt 90.3 kg (199 lb 1.6 oz)   BMI 27.00 kg/m²   Smoking Status Former   BSA 2.14 m²         Objective   Vitals reviewed.   Constitutional:       Appearance: Healthy appearance. Not in distress.   Neck:      Vascular: No JVR. JVD normal.   Pulmonary:      Effort: Pulmonary effort is normal.      Breath sounds: Normal breath sounds. No wheezing. No rhonchi. No rales.   Chest:      Chest wall: Not tender to palpatation.   Cardiovascular:      PMI at left midclavicular line. Normal rate. Regular rhythm. Normal S1. Normal S2.       Murmurs: There is a grade 1/6 systolic murmur at the URSB.      No gallop.  No click. No rub.   Pulses:     Intact distal pulses.   Edema:     Peripheral edema absent.   Abdominal:      General: Bowel sounds are normal.      Palpations: Abdomen is soft.      Tenderness: There is no abdominal tenderness.   Musculoskeletal: Normal range of motion.         General: No tenderness. Skin:     General: Skin is warm and dry.   Neurological:      General: No focal deficit present.      Mental Status: Alert and oriented to person, place and time.    Psychiatric:         Attention and Perception: Attention normal.         Mood and Affect: Mood normal.       Assessment/Plan   Diagnoses and all orders for this visit:  SVT (supraventricular tachycardia)  - Rare episodes  - continue metoprolol  Aortic stenosis due to bicuspid aortic valve  - mild as  - murmur and Sob unchanged  - echo next year   -  no evidence of TAA  Rheumatic heart disease  - echo next year   Essential hypertension  - Well controlled       Current Outpatient Medications:     aspirin 81 mg capsule, Take 1 tablet by mouth once daily., Disp: , Rfl:     hydrALAZINE (Apresoline) 50 mg tablet, Take 1 tablet (50 mg) by mouth 2 times a day., Disp: 180 tablet, Rfl: 1    losartan (Cozaar) 100 mg tablet, Take 1 tablet (100 mg) by mouth once daily., Disp: 90 tablet, Rfl: 1    rosuvastatin (Crestor) 10 mg tablet, Take 1 tablet (10 mg) by mouth once daily at bedtime., Disp: 90 tablet, Rfl: 3    triamterene-hydrochlorothiazid (Dyazide) 37.5-25 mg capsule, Take 1 capsule by mouth once daily., Disp: 90 capsule, Rfl: 1    metoprolol succinate XL (Toprol-XL) 50 mg 24 hr tablet, Take 1 tablet (50 mg) by mouth once daily., Disp: 90 tablet, Rfl: 3    Exclusive of any other services or procedures performed, I, Hailee DRAKE-ANA, spent 30 minutes in duration for this visit today.  This time consisted of chart review, obtaining history, and/or performing the exam as documented above, as well as, documenting the clinical information for the encounter in the electronic record, discussing treatment options, plans, and/or goals with patient, family, and/or caregiver, refilling medications, updating the electronic record, ordering medicines, lab work, imaging, referrals, and/or procedures as documented above and communicating with other [facility] professionals. I have discussed the results of laboratory, radiology, and cardiology studies with the patient and their family/caregiver.

## 2024-04-07 PROBLEM — H35.011 RETINAL MACROANEURYSM OF RIGHT EYE: Status: ACTIVE | Noted: 2024-04-07

## 2024-04-08 ENCOUNTER — OFFICE VISIT (OUTPATIENT)
Dept: OPHTHALMOLOGY | Facility: CLINIC | Age: 74
End: 2024-04-08
Payer: MEDICARE

## 2024-04-08 DIAGNOSIS — H40.001 GLAUCOMA SUSPECT OF RIGHT EYE: ICD-10-CM

## 2024-04-08 DIAGNOSIS — H35.011 RETINAL MACROANEURYSM OF RIGHT EYE: Primary | ICD-10-CM

## 2024-04-08 DIAGNOSIS — H43.812 POSTERIOR VITREOUS DETACHMENT OF LEFT EYE: ICD-10-CM

## 2024-04-08 PROCEDURE — 92134 CPTRZ OPH DX IMG PST SGM RTA: CPT | Performed by: OPHTHALMOLOGY

## 2024-04-08 PROCEDURE — 99213 OFFICE O/P EST LOW 20 MIN: CPT | Performed by: OPHTHALMOLOGY

## 2024-04-08 ASSESSMENT — CUP TO DISC RATIO
OS_RATIO: .55
OD_RATIO: .6

## 2024-04-08 ASSESSMENT — CONF VISUAL FIELD
OD_NORMAL: 1
OS_SUPERIOR_NASAL_RESTRICTION: 0
OS_INFERIOR_NASAL_RESTRICTION: 0
OD_SUPERIOR_TEMPORAL_RESTRICTION: 0
OS_SUPERIOR_TEMPORAL_RESTRICTION: 0
OD_INFERIOR_TEMPORAL_RESTRICTION: 0
OS_NORMAL: 1
OS_INFERIOR_TEMPORAL_RESTRICTION: 0
OD_SUPERIOR_NASAL_RESTRICTION: 0
OD_INFERIOR_NASAL_RESTRICTION: 0

## 2024-04-08 ASSESSMENT — PACHYMETRY
OS_CT(UM): 584
OD_CT(UM): 571

## 2024-04-08 ASSESSMENT — EXTERNAL EXAM - RIGHT EYE: OD_EXAM: NORMAL

## 2024-04-08 ASSESSMENT — TONOMETRY
OS_IOP_MMHG: 22
OD_IOP_MMHG: 22
IOP_METHOD: GOLDMANN APPLANATION

## 2024-04-08 ASSESSMENT — ENCOUNTER SYMPTOMS: EYES NEGATIVE: 1

## 2024-04-08 ASSESSMENT — SLIT LAMP EXAM - LIDS
COMMENTS: GOOD POSITION
COMMENTS: GOOD POSITION

## 2024-04-08 ASSESSMENT — VISUAL ACUITY
METHOD: SNELLEN - LINEAR
OD_CC: 20/25+2
OS_CC: 20/20-1

## 2024-04-08 ASSESSMENT — EXTERNAL EXAM - LEFT EYE: OS_EXAM: NORMAL

## 2024-04-08 NOTE — PROGRESS NOTES
Assessment/Plan   Diagnoses and all orders for this visit:  Retinal macroaneurysm of right eye  Posterior vitreous detachment of left eye  Glaucoma suspect of right eye       1. Retinal macroneurysm OD etiology unclear      PROCEDURE    FA nov 17 22     Late vascular leakage venous OD temporal OD  more than OS   No optic disc leakage no CME OU      This FA is c/w diffuse peripheral retinal ischemia OD needs laser photocaogulation to prevent NVE OD     Now status post (s/p) panretinal photocoagulation (PRP) 12/9/2022          Hi quality OCT  scans obtained   signal good      OCT OD - Normal Foveal Contour, No Edema, IS/OS Junction Normal   OCT OS - Normal Foveal Contour, No Edema, IS/OS Junction Normal            1m f/u may need more laser PRP      Signs/Symptoms of RD discussed. Pt instructed to call asap if notices increased floaters, decreased VA, or peripheral vision changes.

## 2024-05-06 ENCOUNTER — APPOINTMENT (OUTPATIENT)
Dept: RADIOLOGY | Facility: HOSPITAL | Age: 74
End: 2024-05-06
Payer: MEDICARE

## 2024-05-16 ENCOUNTER — OFFICE VISIT (OUTPATIENT)
Dept: OPHTHALMOLOGY | Facility: CLINIC | Age: 74
End: 2024-05-16
Payer: COMMERCIAL

## 2024-05-16 DIAGNOSIS — H40.001 GLAUCOMA SUSPECT OF RIGHT EYE: Primary | ICD-10-CM

## 2024-05-16 PROCEDURE — 92012 INTRM OPH EXAM EST PATIENT: CPT | Performed by: OPTOMETRIST

## 2024-05-16 PROCEDURE — 92133 CPTRZD OPH DX IMG PST SGM ON: CPT | Performed by: OPTOMETRIST

## 2024-05-16 ASSESSMENT — VISUAL ACUITY
OS_CC: 20/25
CORRECTION_TYPE: GLASSES
OD_CC: 20/20
OD_CC+: -1
OS_CC+: +1
METHOD: SNELLEN - LINEAR

## 2024-05-16 ASSESSMENT — EXTERNAL EXAM - RIGHT EYE: OD_EXAM: NORMAL

## 2024-05-16 ASSESSMENT — ENCOUNTER SYMPTOMS
CONSTITUTIONAL NEGATIVE: 0
PSYCHIATRIC NEGATIVE: 0
ALLERGIC/IMMUNOLOGIC NEGATIVE: 0
RESPIRATORY NEGATIVE: 0
CARDIOVASCULAR NEGATIVE: 0
MUSCULOSKELETAL NEGATIVE: 0
GASTROINTESTINAL NEGATIVE: 0
ENDOCRINE NEGATIVE: 0
HEMATOLOGIC/LYMPHATIC NEGATIVE: 0
EYES NEGATIVE: 1
NEUROLOGICAL NEGATIVE: 0

## 2024-05-16 ASSESSMENT — TONOMETRY
OS_IOP_MMHG: 21
IOP_METHOD: GOLDMANN APPLANATION
OD_IOP_MMHG: 21

## 2024-05-16 ASSESSMENT — EXTERNAL EXAM - LEFT EYE: OS_EXAM: NORMAL

## 2024-05-16 ASSESSMENT — SLIT LAMP EXAM - LIDS
COMMENTS: GOOD POSITION
COMMENTS: GOOD POSITION

## 2024-05-16 ASSESSMENT — CUP TO DISC RATIO
OS_RATIO: .55
OD_RATIO: .6

## 2024-05-16 ASSESSMENT — PACHYMETRY
OD_CT(UM): 571
OS_CT(UM): 584

## 2024-05-16 NOTE — PROGRESS NOTES
GLC suspect based on CD ration and rim appearance OD and borderline IOP and Baseline testing:   IOP 21/21 Tmax 21/21, pachy adjust -1/-2 OD/OS.       A Coronel 24-2 threshold visual field test was done.  Results were:   OD: peripheral edge scotoma, pattern standard deviation 4.94 was 4.28 dB   OS: the absence of scotoma, pattern standard deviation 1.69 was 2.29 dB     Optical coherence tomography of the retinal nerve fiber layer (RNFL) revealed:    OD: Poor image acquisition but clear reduced  thickness in all four sectors with an average RNFL thickness of 61 and was 38 micron. GCC analysis 74 and WNL.   OS: Normal thickness in all four sectors with an average RNFL thickness of 81 was 90 was 85 micron. GCC analysis 84 and WNL.     Optical coherence tomography of the macula revealed:    OD: Reduced foveal contour, photoreceptor, retinal pigment epithelium, IS/OS junction, central field 206 micron.  Vitreous hyaloid base visualized.  Findings are normal.    OS:  Normal foveal contour, photoreceptor, retinal pigment epithelium, IS/OS junction, central field 256 micron.  Vitreous hyaloid base visualized.  Findings are normal.  Hx of retinal macroaneurysm OD. Seen by Dr Stout and other retina specialists in the past. Was seen a few months ago.     Patient reports seeing floaters (not new ones) in the OS. The patient was asked to return to our clinic or seek out eye care ASAP if new flashes of light or floaters are noted.  Recommend to see Dr Stout in that case.     Reviewed that primary cause of RNFL loss and VF loss is most likely retinal, or could partially be glaucoma as well.  At this point c/w retinal etiology.     Stable OU and will monitor Q 6 months, VF 24-2 and OCT RNFL and IOP.

## 2024-06-05 DIAGNOSIS — E78.2 MIXED HYPERLIPIDEMIA: ICD-10-CM

## 2024-06-05 RX ORDER — ROSUVASTATIN CALCIUM 10 MG/1
10 TABLET, COATED ORAL NIGHTLY
Qty: 90 TABLET | Refills: 0 | Status: SHIPPED | OUTPATIENT
Start: 2024-06-05

## 2024-07-22 DIAGNOSIS — I10 BENIGN ESSENTIAL HYPERTENSION: ICD-10-CM

## 2024-07-23 RX ORDER — TRIAMTERENE AND HYDROCHLOROTHIAZIDE 37.5; 25 MG/1; MG/1
1 CAPSULE ORAL DAILY
Qty: 90 CAPSULE | Refills: 1 | Status: SHIPPED | OUTPATIENT
Start: 2024-07-23

## 2024-08-08 ENCOUNTER — APPOINTMENT (OUTPATIENT)
Dept: PRIMARY CARE | Facility: CLINIC | Age: 74
End: 2024-08-08
Payer: MEDICARE

## 2024-08-08 VITALS
TEMPERATURE: 97.3 F | SYSTOLIC BLOOD PRESSURE: 124 MMHG | HEART RATE: 83 BPM | DIASTOLIC BLOOD PRESSURE: 84 MMHG | WEIGHT: 201 LBS | BODY MASS INDEX: 27.26 KG/M2 | OXYGEN SATURATION: 97 %

## 2024-08-08 DIAGNOSIS — E78.2 MIXED HYPERLIPIDEMIA: ICD-10-CM

## 2024-08-08 DIAGNOSIS — R73.01 ELEVATED FASTING BLOOD SUGAR: ICD-10-CM

## 2024-08-08 DIAGNOSIS — I10 BENIGN ESSENTIAL HYPERTENSION: Primary | ICD-10-CM

## 2024-08-08 DIAGNOSIS — I01.9 RHEUMATIC FEVER WITH CARDIAC INVOLVEMENT: ICD-10-CM

## 2024-08-08 DIAGNOSIS — R97.20 ELEVATED PSA: ICD-10-CM

## 2024-08-08 PROCEDURE — 1036F TOBACCO NON-USER: CPT | Performed by: NURSE PRACTITIONER

## 2024-08-08 PROCEDURE — 1159F MED LIST DOCD IN RCRD: CPT | Performed by: NURSE PRACTITIONER

## 2024-08-08 PROCEDURE — G2211 COMPLEX E/M VISIT ADD ON: HCPCS | Performed by: NURSE PRACTITIONER

## 2024-08-08 PROCEDURE — 1124F ACP DISCUSS-NO DSCNMKR DOCD: CPT | Performed by: NURSE PRACTITIONER

## 2024-08-08 PROCEDURE — 99213 OFFICE O/P EST LOW 20 MIN: CPT | Performed by: NURSE PRACTITIONER

## 2024-08-08 PROCEDURE — 1160F RVW MEDS BY RX/DR IN RCRD: CPT | Performed by: NURSE PRACTITIONER

## 2024-08-08 PROCEDURE — 3079F DIAST BP 80-89 MM HG: CPT | Performed by: NURSE PRACTITIONER

## 2024-08-08 PROCEDURE — 3074F SYST BP LT 130 MM HG: CPT | Performed by: NURSE PRACTITIONER

## 2024-08-08 ASSESSMENT — ENCOUNTER SYMPTOMS
NEUROLOGICAL NEGATIVE: 1
MUSCULOSKELETAL NEGATIVE: 1
CARDIOVASCULAR NEGATIVE: 1
PSYCHIATRIC NEGATIVE: 1
RESPIRATORY NEGATIVE: 1
CONSTITUTIONAL NEGATIVE: 1

## 2024-08-08 NOTE — PROGRESS NOTES
Subjective   Patient ID: Franky Cai is a 73 y.o. male who presents for Follow-up (6 month blood pressure/Patient has advanced directives in place).    HPI Presents today for follow up and medication renewal.  Tolerates medication well.  Is due for annual blood work.   Is followed by urology and they have ordered MRI of the prostate.   Immunizations as noted  Cologuard 2022 negative    Review of Systems   Constitutional: Negative.    Respiratory: Negative.     Cardiovascular: Negative.    Musculoskeletal: Negative.    Neurological: Negative.    Psychiatric/Behavioral: Negative.         Objective   /84 (BP Location: Left arm, Patient Position: Sitting)   Pulse 83   Temp 36.3 °C (97.3 °F) (Temporal)   Wt 91.2 kg (201 lb)   SpO2 97%   BMI 27.26 kg/m²     Physical Exam  Constitutional:       Appearance: Normal appearance.   Cardiovascular:      Rate and Rhythm: Normal rate and regular rhythm.   Pulmonary:      Effort: Pulmonary effort is normal.      Breath sounds: Normal breath sounds.   Musculoskeletal:         General: Normal range of motion.   Neurological:      General: No focal deficit present.      Mental Status: He is alert.   Psychiatric:         Mood and Affect: Mood normal.         Behavior: Behavior normal.         Assessment/Plan   Problem List Items Addressed This Visit             ICD-10-CM    Benign essential hypertension - Primary I10    Relevant Orders    CBC and Auto Differential    Comprehensive Metabolic Panel    Lipid Panel    Hemoglobin A1C    Elevated fasting blood sugar R73.01    Relevant Orders    Hemoglobin A1C    Elevated PSA R97.20    Relevant Orders    Prostate Specific Antigen, Screen    Hyperlipemia E78.5    Relevant Orders    CBC and Auto Differential    Comprehensive Metabolic Panel    Lipid Panel    Hemoglobin A1C    Rheumatic fever with cardiac involvement I01.9

## 2024-08-08 NOTE — PATIENT INSTRUCTIONS
I will refill your medications with the blood work results.   Please get the update COVID vaccine, your influenza vaccine and the RSV vaccine in the fall.   Follow up in 6 months for your medicare wellness.

## 2024-08-09 ENCOUNTER — LAB (OUTPATIENT)
Dept: LAB | Facility: LAB | Age: 74
End: 2024-08-09
Payer: MEDICARE

## 2024-08-09 DIAGNOSIS — E78.2 MIXED HYPERLIPIDEMIA: ICD-10-CM

## 2024-08-09 DIAGNOSIS — R73.01 ELEVATED FASTING BLOOD SUGAR: ICD-10-CM

## 2024-08-09 DIAGNOSIS — I10 BENIGN ESSENTIAL HYPERTENSION: ICD-10-CM

## 2024-08-09 DIAGNOSIS — R97.20 ELEVATED PSA: ICD-10-CM

## 2024-08-09 LAB
ALBUMIN SERPL BCP-MCNC: 4 G/DL (ref 3.4–5)
ALP SERPL-CCNC: 47 U/L (ref 33–136)
ALT SERPL W P-5'-P-CCNC: 18 U/L (ref 10–52)
ANION GAP SERPL CALC-SCNC: 12 MMOL/L (ref 10–20)
AST SERPL W P-5'-P-CCNC: 17 U/L (ref 9–39)
BASOPHILS # BLD AUTO: 0.06 X10*3/UL (ref 0–0.1)
BASOPHILS NFR BLD AUTO: 0.8 %
BILIRUB SERPL-MCNC: 0.4 MG/DL (ref 0–1.2)
BUN SERPL-MCNC: 24 MG/DL (ref 6–23)
CALCIUM SERPL-MCNC: 9.7 MG/DL (ref 8.6–10.3)
CHLORIDE SERPL-SCNC: 107 MMOL/L (ref 98–107)
CHOLEST SERPL-MCNC: 178 MG/DL (ref 0–199)
CHOLESTEROL/HDL RATIO: 4.1
CO2 SERPL-SCNC: 24 MMOL/L (ref 21–32)
CREAT SERPL-MCNC: 1.38 MG/DL (ref 0.5–1.3)
EGFRCR SERPLBLD CKD-EPI 2021: 54 ML/MIN/1.73M*2
EOSINOPHIL # BLD AUTO: 0.62 X10*3/UL (ref 0–0.4)
EOSINOPHIL NFR BLD AUTO: 7.8 %
ERYTHROCYTE [DISTWIDTH] IN BLOOD BY AUTOMATED COUNT: 14.6 % (ref 11.5–14.5)
EST. AVERAGE GLUCOSE BLD GHB EST-MCNC: 140 MG/DL
GLUCOSE SERPL-MCNC: 115 MG/DL (ref 74–99)
HBA1C MFR BLD: 6.5 %
HCT VFR BLD AUTO: 50.4 % (ref 41–52)
HDLC SERPL-MCNC: 43.6 MG/DL
HGB BLD-MCNC: 16.5 G/DL (ref 13.5–17.5)
IMM GRANULOCYTES # BLD AUTO: 0.02 X10*3/UL (ref 0–0.5)
IMM GRANULOCYTES NFR BLD AUTO: 0.3 % (ref 0–0.9)
LDLC SERPL CALC-MCNC: 77 MG/DL
LYMPHOCYTES # BLD AUTO: 3.09 X10*3/UL (ref 0.8–3)
LYMPHOCYTES NFR BLD AUTO: 38.6 %
MCH RBC QN AUTO: 29 PG (ref 26–34)
MCHC RBC AUTO-ENTMCNC: 32.7 G/DL (ref 32–36)
MCV RBC AUTO: 89 FL (ref 80–100)
MONOCYTES # BLD AUTO: 0.8 X10*3/UL (ref 0.05–0.8)
MONOCYTES NFR BLD AUTO: 10 %
NEUTROPHILS # BLD AUTO: 3.41 X10*3/UL (ref 1.6–5.5)
NEUTROPHILS NFR BLD AUTO: 42.5 %
NON HDL CHOLESTEROL: 134 MG/DL (ref 0–149)
NRBC BLD-RTO: 0 /100 WBCS (ref 0–0)
PLATELET # BLD AUTO: 250 X10*3/UL (ref 150–450)
POTASSIUM SERPL-SCNC: 4 MMOL/L (ref 3.5–5.3)
PROT SERPL-MCNC: 6.3 G/DL (ref 6.4–8.2)
PSA SERPL-MCNC: 25.72 NG/ML
RBC # BLD AUTO: 5.68 X10*6/UL (ref 4.5–5.9)
SODIUM SERPL-SCNC: 139 MMOL/L (ref 136–145)
TRIGL SERPL-MCNC: 286 MG/DL (ref 0–149)
VLDL: 57 MG/DL (ref 0–40)
WBC # BLD AUTO: 8 X10*3/UL (ref 4.4–11.3)

## 2024-08-09 PROCEDURE — 80053 COMPREHEN METABOLIC PANEL: CPT

## 2024-08-09 PROCEDURE — 85025 COMPLETE CBC W/AUTO DIFF WBC: CPT

## 2024-08-09 PROCEDURE — 84153 ASSAY OF PSA TOTAL: CPT

## 2024-08-09 PROCEDURE — 80061 LIPID PANEL: CPT

## 2024-08-09 PROCEDURE — 36415 COLL VENOUS BLD VENIPUNCTURE: CPT

## 2024-08-09 PROCEDURE — 83036 HEMOGLOBIN GLYCOSYLATED A1C: CPT

## 2024-08-12 DIAGNOSIS — E78.2 MIXED HYPERLIPIDEMIA: ICD-10-CM

## 2024-08-12 DIAGNOSIS — I10 BENIGN ESSENTIAL HYPERTENSION: ICD-10-CM

## 2024-08-12 RX ORDER — TRIAMTERENE AND HYDROCHLOROTHIAZIDE 37.5; 25 MG/1; MG/1
1 CAPSULE ORAL DAILY
Qty: 90 CAPSULE | Refills: 1 | Status: SHIPPED | OUTPATIENT
Start: 2024-08-12

## 2024-08-12 RX ORDER — LOSARTAN POTASSIUM 100 MG/1
100 TABLET ORAL DAILY
Qty: 90 TABLET | Refills: 1 | Status: SHIPPED | OUTPATIENT
Start: 2024-08-12

## 2024-08-12 RX ORDER — HYDRALAZINE HYDROCHLORIDE 50 MG/1
50 TABLET, FILM COATED ORAL 2 TIMES DAILY
Qty: 180 TABLET | Refills: 1 | Status: SHIPPED | OUTPATIENT
Start: 2024-08-12

## 2024-08-12 RX ORDER — ROSUVASTATIN CALCIUM 10 MG/1
10 TABLET, COATED ORAL NIGHTLY
Qty: 90 TABLET | Refills: 3 | Status: SHIPPED | OUTPATIENT
Start: 2024-08-12

## 2024-10-14 ENCOUNTER — APPOINTMENT (OUTPATIENT)
Dept: OPHTHALMOLOGY | Facility: CLINIC | Age: 74
End: 2024-10-14
Payer: MEDICARE

## 2024-10-15 ENCOUNTER — HOSPITAL ENCOUNTER (OUTPATIENT)
Dept: RADIOLOGY | Facility: HOSPITAL | Age: 74
Discharge: HOME | End: 2024-10-15
Payer: MEDICARE

## 2024-10-15 DIAGNOSIS — R97.20 ELEVATED PSA: ICD-10-CM

## 2024-10-15 RX ORDER — MIDAZOLAM HYDROCHLORIDE 1 MG/ML
0.5 INJECTION INTRAMUSCULAR; INTRAVENOUS ONCE
Status: DISCONTINUED | OUTPATIENT
Start: 2024-10-15 | End: 2024-10-15

## 2024-10-21 ENCOUNTER — APPOINTMENT (OUTPATIENT)
Dept: RADIOLOGY | Facility: HOSPITAL | Age: 74
End: 2024-10-21
Payer: MEDICARE

## 2024-11-18 ENCOUNTER — PREP FOR PROCEDURE (OUTPATIENT)
Dept: RADIOLOGY | Facility: HOSPITAL | Age: 74
End: 2024-11-18

## 2024-11-18 DIAGNOSIS — R97.20 ELEVATED PSA: Primary | ICD-10-CM

## 2024-11-18 RX ORDER — MIDAZOLAM HYDROCHLORIDE 1 MG/ML
1 INJECTION, SOLUTION INTRAMUSCULAR; INTRAVENOUS AS NEEDED
OUTPATIENT
Start: 2024-11-18

## 2024-11-21 ENCOUNTER — APPOINTMENT (OUTPATIENT)
Dept: OPHTHALMOLOGY | Facility: CLINIC | Age: 74
End: 2024-11-21
Payer: COMMERCIAL

## 2025-01-21 DIAGNOSIS — I10 BENIGN ESSENTIAL HYPERTENSION: ICD-10-CM

## 2025-01-21 RX ORDER — TRIAMTERENE AND HYDROCHLOROTHIAZIDE 37.5; 25 MG/1; MG/1
1 CAPSULE ORAL DAILY
Qty: 90 CAPSULE | Refills: 0 | Status: SHIPPED | OUTPATIENT
Start: 2025-01-21

## 2025-01-27 ENCOUNTER — APPOINTMENT (OUTPATIENT)
Dept: OPHTHALMOLOGY | Age: 75
End: 2025-01-27
Payer: MEDICARE

## 2025-02-25 ENCOUNTER — APPOINTMENT (OUTPATIENT)
Dept: RADIOLOGY | Facility: HOSPITAL | Age: 75
End: 2025-02-25
Payer: MEDICARE

## 2025-02-27 ENCOUNTER — APPOINTMENT (OUTPATIENT)
Dept: PRIMARY CARE | Facility: CLINIC | Age: 75
End: 2025-02-27
Payer: MEDICARE

## 2025-02-27 VITALS
HEIGHT: 72 IN | DIASTOLIC BLOOD PRESSURE: 80 MMHG | SYSTOLIC BLOOD PRESSURE: 128 MMHG | TEMPERATURE: 97.3 F | OXYGEN SATURATION: 92 % | WEIGHT: 205.8 LBS | BODY MASS INDEX: 27.87 KG/M2 | HEART RATE: 120 BPM

## 2025-02-27 DIAGNOSIS — Z00.00 MEDICARE ANNUAL WELLNESS VISIT, SUBSEQUENT: Primary | ICD-10-CM

## 2025-02-27 DIAGNOSIS — R73.01 ELEVATED FASTING BLOOD SUGAR: ICD-10-CM

## 2025-02-27 DIAGNOSIS — N28.1 COMPLEX RENAL CYST: ICD-10-CM

## 2025-02-27 DIAGNOSIS — E78.2 MIXED HYPERLIPIDEMIA: ICD-10-CM

## 2025-02-27 DIAGNOSIS — I10 BENIGN ESSENTIAL HYPERTENSION: ICD-10-CM

## 2025-02-27 DIAGNOSIS — M25.511 ACUTE PAIN OF RIGHT SHOULDER: ICD-10-CM

## 2025-02-27 DIAGNOSIS — Z12.11 ENCOUNTER FOR SCREENING FOR MALIGNANT NEOPLASM OF COLON: ICD-10-CM

## 2025-02-27 DIAGNOSIS — Z00.00 HEALTH CARE MAINTENANCE: ICD-10-CM

## 2025-02-27 LAB — POC HEMOGLOBIN A1C: 7.4 % (ref 4.2–6.5)

## 2025-02-27 PROCEDURE — 1159F MED LIST DOCD IN RCRD: CPT | Performed by: NURSE PRACTITIONER

## 2025-02-27 PROCEDURE — 1160F RVW MEDS BY RX/DR IN RCRD: CPT | Performed by: NURSE PRACTITIONER

## 2025-02-27 PROCEDURE — G0439 PPPS, SUBSEQ VISIT: HCPCS | Performed by: NURSE PRACTITIONER

## 2025-02-27 PROCEDURE — 83036 HEMOGLOBIN GLYCOSYLATED A1C: CPT | Performed by: NURSE PRACTITIONER

## 2025-02-27 PROCEDURE — 3008F BODY MASS INDEX DOCD: CPT | Performed by: NURSE PRACTITIONER

## 2025-02-27 PROCEDURE — 99214 OFFICE O/P EST MOD 30 MIN: CPT | Performed by: NURSE PRACTITIONER

## 2025-02-27 PROCEDURE — 3079F DIAST BP 80-89 MM HG: CPT | Performed by: NURSE PRACTITIONER

## 2025-02-27 PROCEDURE — 1123F ACP DISCUSS/DSCN MKR DOCD: CPT | Performed by: NURSE PRACTITIONER

## 2025-02-27 PROCEDURE — 1170F FXNL STATUS ASSESSED: CPT | Performed by: NURSE PRACTITIONER

## 2025-02-27 PROCEDURE — 3074F SYST BP LT 130 MM HG: CPT | Performed by: NURSE PRACTITIONER

## 2025-02-27 PROCEDURE — 1158F ADVNC CARE PLAN TLK DOCD: CPT | Performed by: NURSE PRACTITIONER

## 2025-02-27 PROCEDURE — 99397 PER PM REEVAL EST PAT 65+ YR: CPT | Performed by: NURSE PRACTITIONER

## 2025-02-27 RX ORDER — METOPROLOL SUCCINATE 50 MG/1
50 TABLET, EXTENDED RELEASE ORAL DAILY
Qty: 90 TABLET | Refills: 3 | Status: SHIPPED | OUTPATIENT
Start: 2025-02-27

## 2025-02-27 RX ORDER — HYDRALAZINE HYDROCHLORIDE 50 MG/1
50 TABLET, FILM COATED ORAL 2 TIMES DAILY
Qty: 180 TABLET | Refills: 1 | Status: SHIPPED | OUTPATIENT
Start: 2025-02-27

## 2025-02-27 RX ORDER — TRIAMTERENE AND HYDROCHLOROTHIAZIDE 37.5; 25 MG/1; MG/1
1 CAPSULE ORAL DAILY
Qty: 90 CAPSULE | Refills: 1 | Status: SHIPPED | OUTPATIENT
Start: 2025-02-27

## 2025-02-27 RX ORDER — LOSARTAN POTASSIUM 100 MG/1
100 TABLET ORAL DAILY
Qty: 90 TABLET | Refills: 1 | Status: SHIPPED | OUTPATIENT
Start: 2025-02-27

## 2025-02-27 ASSESSMENT — ENCOUNTER SYMPTOMS
DEPRESSION: 0
LOSS OF SENSATION IN FEET: 0
OCCASIONAL FEELINGS OF UNSTEADINESS: 0

## 2025-02-27 ASSESSMENT — PATIENT HEALTH QUESTIONNAIRE - PHQ9
SUM OF ALL RESPONSES TO PHQ9 QUESTIONS 1 AND 2: 0
2. FEELING DOWN, DEPRESSED OR HOPELESS: NOT AT ALL
1. LITTLE INTEREST OR PLEASURE IN DOING THINGS: NOT AT ALL

## 2025-02-27 ASSESSMENT — ACTIVITIES OF DAILY LIVING (ADL)
GROCERY_SHOPPING: INDEPENDENT
DOING_HOUSEWORK: INDEPENDENT
MANAGING_FINANCES: INDEPENDENT
TAKING_MEDICATION: INDEPENDENT
DRESSING: INDEPENDENT
BATHING: INDEPENDENT

## 2025-02-27 NOTE — PROGRESS NOTES
"Subjective   Reason for Visit: Franky Cai is an 74 y.o. male here for a Medicare Wellness visit.     Past Medical, Surgical, and Family History reviewed and updated in chart.         HPIPresenbts today for Medicare Wellness, health maintenace and follow up  Dental exam every 6 months  brush and floss.   Eye exam yearly.   And see retinal specialist.   No regular skin checks.   Cologuard 4/2022 negative.   Blood work 8/2024 reviewed as noted.  Immunizations as noted  Requesting referral to nephrology for follow up on kidney cyst as noted.   Has MRI next month for elevated PSA, followed with urology.   Sees cardiology 2 times a year as noted.   Tolerates medication well.  Having pain in his right shoulder when twist it inward for the past month.  No known injury.  No swelling or redness  Patient Care Team:  ANNA Gonzalez as PCP - General  ANNA Gonzalez as PCP - [employer] PCP     Review of Systems   Constitutional: Negative.    HENT: Negative.     Eyes: Negative.    Respiratory: Negative.     Cardiovascular: Negative.    Gastrointestinal: Negative.    Endocrine: Negative.    Musculoskeletal: Negative.    Neurological: Negative.    Hematological: Negative.    Psychiatric/Behavioral: Negative.         Objective   Vitals:  /80 (BP Location: Left arm, Patient Position: Sitting, BP Cuff Size: Large adult)   Pulse (!) 120   Temp 36.3 °C (97.3 °F) (Skin)   Ht 1.816 m (5' 11.5\")   Wt 93.4 kg (205 lb 12.8 oz)   SpO2 92%   BMI 28.30 kg/m²       Physical Exam  Constitutional:       Appearance: Normal appearance.   HENT:      Right Ear: Tympanic membrane, ear canal and external ear normal.      Left Ear: Tympanic membrane, ear canal and external ear normal.      Nose: Nose normal.      Mouth/Throat:      Mouth: Mucous membranes are moist.      Pharynx: Oropharynx is clear.   Eyes:      Pupils: Pupils are equal, round, and reactive to light.   Cardiovascular:      Rate and " Rhythm: Normal rate and regular rhythm.      Pulses: Normal pulses.      Heart sounds: Normal heart sounds.   Pulmonary:      Effort: Pulmonary effort is normal.      Breath sounds: Normal breath sounds.   Abdominal:      General: Abdomen is flat. Bowel sounds are normal.      Palpations: Abdomen is soft.   Musculoskeletal:         General: Normal range of motion.      Cervical back: Normal range of motion.   Lymphadenopathy:      Cervical: No cervical adenopathy.   Skin:     General: Skin is warm.   Neurological:      General: No focal deficit present.      Mental Status: He is alert and oriented to person, place, and time.   Psychiatric:         Mood and Affect: Mood normal.         Behavior: Behavior normal.         Assessment & Plan  Medicare annual wellness visit, subsequent         Health care maintenance         Benign essential hypertension    Orders:    losartan (Cozaar) 100 mg tablet; Take 1 tablet (100 mg) by mouth once daily.    hydrALAZINE (Apresoline) 50 mg tablet; Take 1 tablet (50 mg) by mouth 2 times a day.    metoprolol succinate XL (Toprol-XL) 50 mg 24 hr tablet; Take 1 tablet (50 mg) by mouth once daily.    triamterene-hydrochlorothiazid (Dyazide) 37.5-25 mg capsule; Take 1 capsule by mouth once daily.    Elevated fasting blood sugar    Orders:    POCT glycosylated hemoglobin (Hb A1C) manually resulted    Mixed hyperlipidemia         Complex renal cyst    Orders:    Referral to Nephrology; Future    Acute pain of right shoulder    Orders:    Referral to Physical Therapy; Future    Encounter for screening for malignant neoplasm of colon    Orders:    Cologuard® colon cancer screening; Future

## 2025-02-27 NOTE — PATIENT INSTRUCTIONS
Medications renewed.   I have referred you nephrology.  Please do the physical therapy for your shoulder and let me know in 1 month if no better.   Follow up in 6 months with Pari Sauer for transfer of care because of my correction.   It has been a pleasure providing for you your health care

## 2025-02-28 ASSESSMENT — ENCOUNTER SYMPTOMS
PSYCHIATRIC NEGATIVE: 1
RESPIRATORY NEGATIVE: 1
ENDOCRINE NEGATIVE: 1
EYES NEGATIVE: 1
HEMATOLOGIC/LYMPHATIC NEGATIVE: 1
NEUROLOGICAL NEGATIVE: 1
CONSTITUTIONAL NEGATIVE: 1
MUSCULOSKELETAL NEGATIVE: 1
GASTROINTESTINAL NEGATIVE: 1
CARDIOVASCULAR NEGATIVE: 1

## 2025-02-28 NOTE — ASSESSMENT & PLAN NOTE
Orders:    losartan (Cozaar) 100 mg tablet; Take 1 tablet (100 mg) by mouth once daily.    hydrALAZINE (Apresoline) 50 mg tablet; Take 1 tablet (50 mg) by mouth 2 times a day.    metoprolol succinate XL (Toprol-XL) 50 mg 24 hr tablet; Take 1 tablet (50 mg) by mouth once daily.    triamterene-hydrochlorothiazid (Dyazide) 37.5-25 mg capsule; Take 1 capsule by mouth once daily.

## 2025-03-10 ENCOUNTER — HOSPITAL ENCOUNTER (OUTPATIENT)
Dept: CARDIOLOGY | Facility: CLINIC | Age: 75
Discharge: HOME | End: 2025-03-10
Payer: MEDICARE

## 2025-03-10 ENCOUNTER — OFFICE VISIT (OUTPATIENT)
Dept: CARDIOLOGY | Facility: CLINIC | Age: 75
End: 2025-03-10
Payer: MEDICARE

## 2025-03-10 VITALS
DIASTOLIC BLOOD PRESSURE: 72 MMHG | HEART RATE: 96 BPM | HEIGHT: 72 IN | BODY MASS INDEX: 27.77 KG/M2 | SYSTOLIC BLOOD PRESSURE: 132 MMHG | WEIGHT: 205 LBS

## 2025-03-10 DIAGNOSIS — I09.9 RHEUMATIC HEART DISEASE: ICD-10-CM

## 2025-03-10 DIAGNOSIS — I47.10 SVT (SUPRAVENTRICULAR TACHYCARDIA) (CMS-HCC): Primary | ICD-10-CM

## 2025-03-10 DIAGNOSIS — Q23.81 AORTIC STENOSIS DUE TO BICUSPID AORTIC VALVE: ICD-10-CM

## 2025-03-10 DIAGNOSIS — I10 BENIGN ESSENTIAL HYPERTENSION: ICD-10-CM

## 2025-03-10 DIAGNOSIS — Q23.0 AORTIC STENOSIS DUE TO BICUSPID AORTIC VALVE: ICD-10-CM

## 2025-03-10 DIAGNOSIS — Q23.1 CONGENITAL INSUFFICIENCY OF AORTIC VALVE (HHS-HCC): ICD-10-CM

## 2025-03-10 LAB
AORTIC VALVE MEAN GRADIENT: 7 MMHG
AORTIC VALVE PEAK VELOCITY: 1.84 M/S
AV PEAK GRADIENT: 14 MMHG
AVA (PEAK VEL): 1.9 CM2
AVA (VTI): 2.01 CM2
EJECTION FRACTION APICAL 4 CHAMBER: 47.5
EJECTION FRACTION: 62 %
LEFT ATRIUM VOLUME AREA LENGTH INDEX BSA: 25.6 ML/M2
LEFT VENTRICLE INTERNAL DIMENSION DIASTOLE: 3.9 CM (ref 3.5–6)
LEFT VENTRICULAR OUTFLOW TRACT DIAMETER: 2.07 CM
MITRAL VALVE E/A RATIO: 0.76
RIGHT VENTRICLE FREE WALL PEAK S': 12 CM/S
TRICUSPID ANNULAR PLANE SYSTOLIC EXCURSION: 2.1 CM

## 2025-03-10 PROCEDURE — 3078F DIAST BP <80 MM HG: CPT | Performed by: NURSE PRACTITIONER

## 2025-03-10 PROCEDURE — 1123F ACP DISCUSS/DSCN MKR DOCD: CPT | Performed by: NURSE PRACTITIONER

## 2025-03-10 PROCEDURE — 1160F RVW MEDS BY RX/DR IN RCRD: CPT | Performed by: NURSE PRACTITIONER

## 2025-03-10 PROCEDURE — 1159F MED LIST DOCD IN RCRD: CPT | Performed by: NURSE PRACTITIONER

## 2025-03-10 PROCEDURE — 3008F BODY MASS INDEX DOCD: CPT | Performed by: NURSE PRACTITIONER

## 2025-03-10 PROCEDURE — 3075F SYST BP GE 130 - 139MM HG: CPT | Performed by: NURSE PRACTITIONER

## 2025-03-10 PROCEDURE — 99214 OFFICE O/P EST MOD 30 MIN: CPT | Performed by: NURSE PRACTITIONER

## 2025-03-10 PROCEDURE — 1036F TOBACCO NON-USER: CPT | Performed by: NURSE PRACTITIONER

## 2025-03-10 PROCEDURE — 93306 TTE W/DOPPLER COMPLETE: CPT | Performed by: INTERNAL MEDICINE

## 2025-03-10 PROCEDURE — 93306 TTE W/DOPPLER COMPLETE: CPT

## 2025-03-10 NOTE — PROGRESS NOTES
Chief Complaint:   SVT and bicuspid aortic valve      History Of Present Illness:    Franky Cai is a 74 y.o. male here with SVT bicuspid aortic valve. He has no cardiac complaints.     Denies palpitations. Denies SOB, LE edema, chest pain.     Continues to exercise without complaint.     Holter (Rare PVCs, Rare PACs, Nonsustained SVT) - 2/14/2022    Past Medical History:  He has a past medical history of Acne, unspecified (02/19/2013), Ankle weakness (05/11/2016), Aortic stenosis due to bicuspid aortic valve (03/07/2024), Cutaneous abscess of face (02/19/2013), Fracture of ankle, left, open (01/27/2016), Glaucoma suspect of right eye, Hypermetropia of both eyes, Inflammatory disease of prostate, unspecified (02/19/2013), Other conditions influencing health status (02/19/2013), Other conditions influencing health status (02/19/2013), Paroxysmal supraventricular tachycardia (CMS-HCC) (03/07/2024), Posterior vitreous detachment of left eye, Retinal macroaneurysm of right eye, Rheumatic heart disease, unspecified (07/19/2022), and UTI (urinary tract infection) (07/11/2023).    Past Surgical History:  He has a past surgical history that includes Other surgical history (09/16/2022) and Retinal laser procedure (Left, 12/09/2022).      Social History:  He reports that he quit smoking about 26 years ago. His smoking use included cigarettes. He has never been exposed to tobacco smoke. He has never used smokeless tobacco. He reports current alcohol use of about 1.0 standard drink of alcohol per week. He reports that he does not currently use drugs.    Family History:  Family History   Problem Relation Name Age of Onset    Other (malignant neoplasm cervix uteri) Mother      Other (cardiac disorder) Father      Hypertension Father       Allergies:  Patient has no known allergies.    Review of Systems  All pertinent systems have been reviewed and are negative except for what is stated in the history of present illness.    All  other systems have been reviewed and are negative and noncontributory to this patient's current ailments.     Visit Vitals  /72 (BP Location: Right arm, Patient Position: Sitting, BP Cuff Size: Large adult)   Pulse 96   Ht 1.829 m (6')   Wt 93 kg (205 lb)   BMI 27.80 kg/m²   Smoking Status Former   BSA 2.17 m²     Objective   Vitals reviewed.   Constitutional:       Appearance: Healthy appearance. Not in distress.   Neck:      Vascular: No JVR. JVD normal.   Pulmonary:      Effort: Pulmonary effort is normal.      Breath sounds: Normal breath sounds. No wheezing. No rhonchi. No rales.   Chest:      Chest wall: Not tender to palpatation.   Cardiovascular:      PMI at left midclavicular line. Normal rate. Regular rhythm. Normal S1. Normal S2.       Murmurs: There is a grade 1/6 systolic murmur at the URSB.      No gallop.  No click. No rub.   Pulses:     Intact distal pulses.   Edema:     Peripheral edema absent.   Abdominal:      General: Bowel sounds are normal.      Palpations: Abdomen is soft.      Tenderness: There is no abdominal tenderness.   Musculoskeletal: Normal range of motion.         General: No tenderness. Skin:     General: Skin is warm and dry.   Neurological:      General: No focal deficit present.      Mental Status: Alert and oriented to person, place and time.   Psychiatric:         Attention and Perception: Attention normal.         Mood and Affect: Mood normal.       Assessment/Plan   Diagnoses and all orders for this visit:  SVT (supraventricular tachycardia)  - Well controlled, denies palpitations   - continue metoprolol  Aortic stenosis due to bicuspid aortic valve  - Mild aortic stenosis, unchanged from last year TTE  - annual TTE  - no evidence of TAA  Rheumatic heart disease  - echo next year   Essential hypertension  - Well controlled   - continue losartan, hydralazine, metoprolol, dyazide     Follow up 1 year with TTE    Outpatient Medications:  Current Outpatient Medications    Medication Instructions    aspirin 81 mg capsule 1 tablet, Daily    hydrALAZINE (APRESOLINE) 50 mg, oral, 2 times daily    losartan (COZAAR) 100 mg, oral, Daily    metoprolol succinate XL (TOPROL-XL) 50 mg, oral, Daily    rosuvastatin (CRESTOR) 10 mg, oral, Nightly    triamterene-hydrochlorothiazid (Dyazide) 37.5-25 mg capsule 1 capsule, oral, Daily         Exclusive of any other services or procedures performed, I, Hailee CASTILLO, spent 20 minutes in duration for this visit today.  This time consisted of chart review, obtaining history, and/or performing the exam as documented above, as well as, documenting the clinical information for the encounter in the electronic record, discussing treatment options, plans, and/or goals with patient, family, and/or caregiver, refilling medications, updating the electronic record, ordering medicines, lab work, imaging, referrals, and/or procedures as documented above and communicating with other [facility] professionals. I have discussed the results of laboratory, radiology, and cardiology studies with the patient and their family/caregiver.       I reviewed PCP note, labs, TTE

## 2025-03-18 LAB — NONINV COLON CA DNA+OCC BLD SCRN STL QL: NEGATIVE

## 2025-03-21 ENCOUNTER — APPOINTMENT (OUTPATIENT)
Dept: OPHTHALMOLOGY | Age: 75
End: 2025-03-21
Payer: MEDICARE

## 2025-03-21 DIAGNOSIS — H40.001 GLAUCOMA SUSPECT OF RIGHT EYE: Primary | ICD-10-CM

## 2025-03-21 PROCEDURE — 92083 EXTENDED VISUAL FIELD XM: CPT | Performed by: OPTOMETRIST

## 2025-03-21 PROCEDURE — 92133 CPTRZD OPH DX IMG PST SGM ON: CPT | Performed by: OPTOMETRIST

## 2025-03-21 ASSESSMENT — SLIT LAMP EXAM - LIDS
COMMENTS: GOOD POSITION
COMMENTS: GOOD POSITION

## 2025-03-21 ASSESSMENT — CUP TO DISC RATIO
OS_RATIO: .55
OD_RATIO: .6

## 2025-03-21 ASSESSMENT — ENCOUNTER SYMPTOMS
RESPIRATORY NEGATIVE: 0
MUSCULOSKELETAL NEGATIVE: 0
NEUROLOGICAL NEGATIVE: 0
CARDIOVASCULAR NEGATIVE: 0
ENDOCRINE NEGATIVE: 0
PSYCHIATRIC NEGATIVE: 0
ALLERGIC/IMMUNOLOGIC NEGATIVE: 0
EYES NEGATIVE: 1
HEMATOLOGIC/LYMPHATIC NEGATIVE: 0
GASTROINTESTINAL NEGATIVE: 0
CONSTITUTIONAL NEGATIVE: 0

## 2025-03-21 ASSESSMENT — PACHYMETRY
OD_CT(UM): 571
OS_CT(UM): 584

## 2025-03-21 ASSESSMENT — TONOMETRY
OS_IOP_MMHG: 20
IOP_METHOD: GOLDMANN APPLANATION
OD_IOP_MMHG: 20

## 2025-03-21 ASSESSMENT — EXTERNAL EXAM - RIGHT EYE: OD_EXAM: NORMAL

## 2025-03-21 ASSESSMENT — VISUAL ACUITY
OD_CC: 20/25
METHOD: SNELLEN - LINEAR
OS_CC+: -2
OS_CC: 20/25
CORRECTION_TYPE: GLASSES

## 2025-03-21 ASSESSMENT — EXTERNAL EXAM - LEFT EYE: OS_EXAM: NORMAL

## 2025-03-21 NOTE — PROGRESS NOTES
GLC suspect based on CD ration and rim appearance OD and borderline IOP and Baseline testing:   IOP 20/20 Tmax 21/21, pachy adjust -1/-2 OD/OS.       A Coronel 24-2 threshold visual field test was done.  Results were:   OD: edge point defects was peripheral edge scotoma, pattern standard deviation 8.39 was 4.94 was 4.28 dB   OS: edge point defects was the absence of scotoma, pattern standard deviation  8.39 was 1.69 was 2.29 dB    Optical coherence tomography of the retinal nerve fiber layer (RNFL) revealed:    OD: Vertical reduced thickness in all four sectors with an average RNFL thickness of 64 was 61 and was 38 micron. GCC analysis 74 and WNL.   OS: Normal thickness in all four sectors with an average RNFL thickness of 84 was 81 was 90 was 85 micron. GCC analysis 84 and WNL.     Optical coherence tomography of the macula revealed:    OD: Reduced foveal contour, photoreceptor, retinal pigment epithelium, IS/OS junction, central field 206 micron.  Vitreous hyaloid base visualized.  Findings are normal.    OS: Normal foveal contour, photoreceptor, retinal pigment epithelium, IS/OS junction, central field 256 micron.  Vitreous hyaloid base visualized.  Findings are normal.  Hx of retinal macroaneurysm OD. Seen by Dr Stout and other retina specialists in the past. Was seen a few months ago.     Patient reports seeing floaters (not new ones) in the OS. The patient was asked to return to our clinic or seek out eye care ASAP if new flashes of light or floaters are noted.  Recommend to see Dr Stout in that case.     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 I Reviewed that primary cause of RNFL loss and VF loss is most likely retinal, but could partially be glaucoma as well.  The OCT RNFL reveals superior and inferior RNFL thinning OD. OS completely normal and IOP is symmetrical.     Stable OU and will monitor Q 6 months, VA manifest refraction (MR) VF 24-2 and OCT RNFL and IOP. Will have low threshold for  starting treatment.

## 2025-03-28 ENCOUNTER — HOSPITAL ENCOUNTER (OUTPATIENT)
Dept: RADIOLOGY | Facility: HOSPITAL | Age: 75
Discharge: HOME | End: 2025-03-28
Payer: MEDICARE

## 2025-03-28 VITALS
SYSTOLIC BLOOD PRESSURE: 108 MMHG | OXYGEN SATURATION: 97 % | RESPIRATION RATE: 22 BRPM | DIASTOLIC BLOOD PRESSURE: 81 MMHG | HEART RATE: 64 BPM

## 2025-03-28 DIAGNOSIS — R97.20 ELEVATED PSA: ICD-10-CM

## 2025-03-28 PROCEDURE — A9575 INJ GADOTERATE MEGLUMI 0.1ML: HCPCS | Performed by: UROLOGY

## 2025-03-28 PROCEDURE — 2500000004 HC RX 250 GENERAL PHARMACY W/ HCPCS (ALT 636 FOR OP/ED): Performed by: STUDENT IN AN ORGANIZED HEALTH CARE EDUCATION/TRAINING PROGRAM

## 2025-03-28 PROCEDURE — 72197 MRI PELVIS W/O & W/DYE: CPT

## 2025-03-28 PROCEDURE — 2550000001 HC RX 255 CONTRASTS: Performed by: UROLOGY

## 2025-03-28 RX ORDER — MIDAZOLAM HYDROCHLORIDE 1 MG/ML
0.5 INJECTION INTRAMUSCULAR; INTRAVENOUS EVERY 5 MIN PRN
OUTPATIENT
Start: 2025-03-28

## 2025-03-28 RX ORDER — MIDAZOLAM HYDROCHLORIDE 1 MG/ML
0.5 INJECTION INTRAMUSCULAR; INTRAVENOUS AS NEEDED
Status: COMPLETED | OUTPATIENT
Start: 2025-03-28 | End: 2025-03-28

## 2025-03-28 RX ORDER — GADOTERATE MEGLUMINE 376.9 MG/ML
19 INJECTION INTRAVENOUS
Status: COMPLETED | OUTPATIENT
Start: 2025-03-28 | End: 2025-03-28

## 2025-03-28 RX ADMIN — MIDAZOLAM HYDROCHLORIDE 0.5 MG: 2 INJECTION, SOLUTION INTRAMUSCULAR; INTRAVENOUS at 09:35

## 2025-03-28 RX ADMIN — MIDAZOLAM HYDROCHLORIDE 0.5 MG: 2 INJECTION, SOLUTION INTRAMUSCULAR; INTRAVENOUS at 09:20

## 2025-03-28 RX ADMIN — GADOTERATE MEGLUMINE 19 ML: 376.9 INJECTION INTRAVENOUS at 10:26

## 2025-03-28 RX ADMIN — MIDAZOLAM HYDROCHLORIDE 0.5 MG: 2 INJECTION, SOLUTION INTRAMUSCULAR; INTRAVENOUS at 09:45

## 2025-03-28 RX ADMIN — MIDAZOLAM HYDROCHLORIDE 0.5 MG: 2 INJECTION, SOLUTION INTRAMUSCULAR; INTRAVENOUS at 09:25

## 2025-03-28 NOTE — NURSING NOTE
1030 Patient discharged to home in stable condition, tolerated MRI scan with midazolam 2mg IV without incident. BL

## 2025-04-09 ENCOUNTER — APPOINTMENT (OUTPATIENT)
Dept: UROLOGY | Facility: CLINIC | Age: 75
End: 2025-04-09
Payer: MEDICARE

## 2025-04-09 DIAGNOSIS — N28.89 RENAL MASS: ICD-10-CM

## 2025-04-09 PROCEDURE — 1126F AMNT PAIN NOTED NONE PRSNT: CPT | Performed by: UROLOGY

## 2025-04-09 PROCEDURE — 1159F MED LIST DOCD IN RCRD: CPT | Performed by: UROLOGY

## 2025-04-09 PROCEDURE — 99214 OFFICE O/P EST MOD 30 MIN: CPT | Performed by: UROLOGY

## 2025-04-09 PROCEDURE — 1123F ACP DISCUSS/DSCN MKR DOCD: CPT | Performed by: UROLOGY

## 2025-04-09 PROCEDURE — G2211 COMPLEX E/M VISIT ADD ON: HCPCS | Performed by: UROLOGY

## 2025-04-09 ASSESSMENT — PAIN SCALES - GENERAL: PAINLEVEL_OUTOF10: 0-NO PAIN

## 2025-04-09 NOTE — PROGRESS NOTES
Subjective   Franky Cai is a 74 y.o. with history of elevated PSA s/p negative prostate biopsy, UTI's and renal mass; presenting today to review prostate MRI. Patient has no bothersome urinary symptoms. He denies any UTI's. Denies any recent gross hematuria, fevers, chills, urinary retention, intractable flank or abdominal pain, nausea or vomiting.            LAST VISIT (11/21/2023):  Franky Cai is a 74 y.o. male who presents as a new patient kindly referred by Dr. Arreguin due to elevated PSA s/p negative prostate biopsy. Patient was admitted to [facility] 5/2023 due to UTI secondary to klebsiella and bacteremia. PSA aden significantly on admission, has come back was 33.96 on 8/2023. He had a renal scan done which showed right-sided renal mass.  He has nocturia x1. No other bothersome urinary symptoms.  He denies flank pain, gross hematuria, kidney stones, and recurrent UTI.    Objective   Past Medical History:   Diagnosis Date    Acne, unspecified 02/19/2013    Acne    Ankle weakness 05/11/2016    Aortic stenosis due to bicuspid aortic valve 03/07/2024    Cutaneous abscess of face 02/19/2013    Abscess of face    Fracture of ankle, left, open 01/27/2016    Glaucoma suspect of right eye     Hypermetropia of both eyes     Inflammatory disease of prostate, unspecified 02/19/2013    Prostatitis    Other conditions influencing health status 02/19/2013    Infective Myositis    Other conditions influencing health status 02/19/2013    Familial Combined Hyperlipidemia    Paroxysmal supraventricular tachycardia (CMS-HCC) 03/07/2024    Posterior vitreous detachment of left eye     Retinal macroaneurysm of right eye     Rheumatic heart disease, unspecified 07/19/2022    Rheumatic heart disease    UTI (urinary tract infection) 07/11/2023     Past Surgical History:   Procedure Laterality Date    OTHER SURGICAL HISTORY  09/16/2022    No history of surgery    RETINAL LASER PROCEDURE Left 12/09/2022    Laser  panphotocoagulation     Current Outpatient Medications on File Prior to Visit   Medication Sig Dispense Refill    aspirin 81 mg capsule Take 1 tablet by mouth once daily.      hydrALAZINE (Apresoline) 50 mg tablet Take 1 tablet (50 mg) by mouth 2 times a day. 180 tablet 1    losartan (Cozaar) 100 mg tablet Take 1 tablet (100 mg) by mouth once daily. 90 tablet 1    metoprolol succinate XL (Toprol-XL) 50 mg 24 hr tablet Take 1 tablet (50 mg) by mouth once daily. 90 tablet 3    rosuvastatin (Crestor) 10 mg tablet Take 1 tablet (10 mg) by mouth once daily at bedtime. 90 tablet 3    triamterene-hydrochlorothiazid (Dyazide) 37.5-25 mg capsule Take 1 capsule by mouth once daily. 90 capsule 1     No current facility-administered medications on file prior to visit.     No Known Allergies    There were no vitals taken for this visit.  Physical Exam    Lab Review  Lab Results   Component Value Date    PSA 33.96 (H) 08/03/2023     IPSS 3 and 1      Assessment/Plan   Diagnoses and all orders for this visit:  Renal mass      Elevated PSA s/p negative prostate biopsy   PI-RADS 3 lesion       I reviewed prostate MRI from 3/28/2025 which showed a 132 g prostate with a 2.2 cm and 1.1 cm PI-RADS 3 lesions in the transitional zone felt most likely to be BPH nodules and an extruded BPH nodule respectively, without evidence of extracapsular disease.    We discussed fusion biopsy vs. Observation. Patient elects to proceed with observation.    We will refer him to Dr. Urrutia for further management.     Renal Mass       CT of kidneys from 8/1/2023 showed a solid enhancing 2 cm lesion upper portion left kidney is compatible with neoplasm, indeterminate subtype.Multiple additional renal lesions more likely benign.       We discussed treatment options for enhancing renal mass which include renal biopsy vs. percutaneous ablation vs. partial nephrectomy vs. radical nephrectomy.      Patient will follow up with Dr. Urrutia for further management and  evaluation.       All questions were answered to the patient's satisfaction. Patient agrees with the plan and wishes to proceed. Follow-up will be scheduled appropriately.     E&M visit today is associated with current or anticipated ongoing medical care services related to a patient's single, serious condition or a complex condition.    I spent 30 minutes of dedicated E&M time, including preparation and review of records, notes, and data, time spent with patient/family, and documentation.     Scribed for Dr. Watters by Mackenzie Neely. I , Dr Watters, have personally reviewed and agreed with the information entered by the Virtual Scribe.

## 2025-04-30 NOTE — PROGRESS NOTES
HPI:  Proc (2/11/20): prostate biopsy performed by Dr. Arreguin (PSA at time of bx 12.30 in 1/20, then 21.11 in 9/20)  Path: benign prostate tissue     Franky Cai is a 74 y.o. male referred by Dr. Watters for renal lesion and elevated PSA. Hx of ED, HTN, HLD. CT kidney (9/1/23) showed solid enhancing 2 cm lesion upper portion left kidney is compatible with neoplasm, indeterminate subtype, multiple additional renal lesions more likely benign, indolent although some have equivocal findings. MRI Prostate (3/28/25) showed 132g, 2.2 cm and 1.1 cm PI-RADS 3 lesions in the TZ felt most likely to be BPH nodules and an extruded BPH nodule respectively, without evidence of extracapsular disease. Good urinary function.     PSA: 25.72 (8/9/24), 308.59 (4/28/23)  Cre: 1.38 (8/9/24)    CT kidney (9/1/23): solid enhancing 2 cm lesion upper portion left kidney is compatible with neoplasm, indeterminate subtype, multiple additional renal lesions more likely benign, indolent although some have equivocal findings, 18 x 8 mm oblong probably benign cystic lesion pancreatic head without high-risk features, left adrenal nodule most compatible with adenoma, right adrenal nodule is too small to characterize although most likely incidental.    MRI Prostate (3/28/25): 132g, 2.2 cm and 1.1 cm PI-RADS 3 lesions in the TZ felt most likely to be BPH nodules and an extruded BPH nodule respectively, without evidence of extracapsular disease.    Review of Systems:  All systems reviewed. Anything negative noted in the HPI.    Physical Exam:  Vitals signs reviewed.  Constitutional:      Appearance: Well-developed.  HENT:     Head: Normocephalic and atraumatic.  Neck:     Musculoskeletal: Normal range of motion.  Pulmonary:     Effort: Pulmonary effort is normal.  Musculoskeletal: Normal range of motion.  Skin:     General: Skin is warm and dry.  Neurological:     Mental Status: Alert and oriented to person, place, and time.  Psychiatric:         Behavior: Behavior normal.        Thought Content: Thought content normal.        Judgment: Judgment normal.    Procedures:    Assessment/Plan   Franky Cai is a 74 y.o. male referred by Dr. Watters for renal lesion and elevated PSA. Hx of ED, HTN, HLD. CT kidney (9/1/23) showed solid enhancing 2 cm lesion upper portion left kidney is compatible with neoplasm, indeterminate subtype, multiple additional renal lesions more likely benign, indolent although some have equivocal findings. MRI Prostate (3/28/25) showed 132g, 2.2 cm and 1.1 cm PI-RADS 3 lesions in the TZ felt most likely to be BPH nodules and an extruded BPH nodule respectively, without evidence of extracapsular disease. Good urinary function. Management options including risks, benefits and alternatives discussed at length and all questions answered. Patient prefers to proceed with 4K score, CT kidney, CT chest, and follow-up in 1 month.         Scribe Attestation:  By signing my name below, IJade Scribe   attest that this documentation has been prepared under the direction and in the presence of Doron Urrutia MD.

## 2025-05-01 ENCOUNTER — APPOINTMENT (OUTPATIENT)
Dept: UROLOGY | Facility: CLINIC | Age: 75
End: 2025-05-01
Payer: MEDICARE

## 2025-05-01 VITALS — HEIGHT: 72 IN | TEMPERATURE: 97 F | BODY MASS INDEX: 27.81 KG/M2

## 2025-05-01 DIAGNOSIS — R97.20 ELEVATED PSA: ICD-10-CM

## 2025-05-01 DIAGNOSIS — N28.89 RENAL MASS: Primary | ICD-10-CM

## 2025-05-01 PROCEDURE — 81539 ONCOLOGY PROSTATE PROB SCORE: CPT

## 2025-05-01 PROCEDURE — 36415 COLL VENOUS BLD VENIPUNCTURE: CPT

## 2025-05-01 ASSESSMENT — PAIN SCALES - GENERAL: PAINLEVEL_OUTOF10: 0-NO PAIN

## 2025-05-02 ENCOUNTER — LAB REQUISITION (OUTPATIENT)
Dept: LAB | Facility: HOSPITAL | Age: 75
End: 2025-05-02
Payer: MEDICARE

## 2025-05-02 DIAGNOSIS — R97.20 ELEVATED PROSTATE SPECIFIC ANTIGEN (PSA): ICD-10-CM

## 2025-05-02 DIAGNOSIS — N28.89 OTHER SPECIFIED DISORDERS OF KIDNEY AND URETER: ICD-10-CM

## 2025-05-02 LAB
ANION GAP SERPL CALCULATED.4IONS-SCNC: 12 MMOL/L (CALC) (ref 7–17)
BUN SERPL-MCNC: 29 MG/DL (ref 7–25)
BUN/CREAT SERPL: 22 (CALC) (ref 6–22)
CALCIUM SERPL-MCNC: 10.1 MG/DL (ref 8.6–10.3)
CHLORIDE SERPL-SCNC: 102 MMOL/L (ref 98–110)
CO2 SERPL-SCNC: 25 MMOL/L (ref 20–32)
CREAT SERPL-MCNC: 1.31 MG/DL (ref 0.7–1.28)
EGFRCR SERPLBLD CKD-EPI 2021: 57 ML/MIN/1.73M2
GLUCOSE SERPL-MCNC: 92 MG/DL (ref 65–139)
Lab: NORMAL
POTASSIUM SERPL-SCNC: 4.3 MMOL/L (ref 3.5–5.3)
SODIUM SERPL-SCNC: 139 MMOL/L (ref 135–146)

## 2025-05-12 ENCOUNTER — APPOINTMENT (OUTPATIENT)
Dept: OPHTHALMOLOGY | Age: 75
End: 2025-05-12
Payer: MEDICARE

## 2025-05-13 ENCOUNTER — HOSPITAL ENCOUNTER (OUTPATIENT)
Dept: RADIOLOGY | Facility: CLINIC | Age: 75
End: 2025-05-13
Payer: MEDICARE

## 2025-05-13 LAB
4K - CLINICAL INDICATION FOR ORDER: ABNORMAL
PHYS FIND RECTUM: ABNORMAL
PROSTATE PATH BX REPORT: ABNORMAL
PSA FREE MFR SERPL: 15 %
PSA FREE SERPL IA-MCNC: 3.77 NG/ML
PSA SERPL IA-MCNC: 25.2 NG/ML
REF LAB TEST RESULTS: 73.5

## 2025-05-22 ENCOUNTER — HOSPITAL ENCOUNTER (OUTPATIENT)
Dept: RADIOLOGY | Facility: CLINIC | Age: 75
Discharge: HOME | End: 2025-05-22
Payer: MEDICARE

## 2025-05-22 DIAGNOSIS — N28.89 RENAL MASS: ICD-10-CM

## 2025-05-22 PROCEDURE — 74170 CT ABD WO CNTRST FLWD CNTRST: CPT

## 2025-05-22 PROCEDURE — 71260 CT THORAX DX C+: CPT

## 2025-05-22 PROCEDURE — 2550000001 HC RX 255 CONTRASTS: Performed by: STUDENT IN AN ORGANIZED HEALTH CARE EDUCATION/TRAINING PROGRAM

## 2025-05-22 RX ADMIN — IOHEXOL 68 ML: 350 INJECTION, SOLUTION INTRAVENOUS at 13:06

## 2025-05-27 ENCOUNTER — APPOINTMENT (OUTPATIENT)
Dept: PRIMARY CARE | Facility: CLINIC | Age: 75
End: 2025-05-27
Payer: MEDICARE

## 2025-05-27 VITALS
SYSTOLIC BLOOD PRESSURE: 130 MMHG | BODY MASS INDEX: 26.99 KG/M2 | TEMPERATURE: 97.3 F | WEIGHT: 199 LBS | OXYGEN SATURATION: 97 % | HEART RATE: 81 BPM | DIASTOLIC BLOOD PRESSURE: 82 MMHG

## 2025-05-27 DIAGNOSIS — R73.03 PREDIABETES: Primary | ICD-10-CM

## 2025-05-27 DIAGNOSIS — N18.31 CHRONIC KIDNEY DISEASE, STAGE 3A (MULTI): ICD-10-CM

## 2025-05-27 DIAGNOSIS — R73.01 ELEVATED FASTING BLOOD SUGAR: ICD-10-CM

## 2025-05-27 LAB — POC HEMOGLOBIN A1C: 6.1 % (ref 4.2–6.5)

## 2025-05-27 PROCEDURE — 1159F MED LIST DOCD IN RCRD: CPT | Performed by: NURSE PRACTITIONER

## 2025-05-27 PROCEDURE — 1160F RVW MEDS BY RX/DR IN RCRD: CPT | Performed by: NURSE PRACTITIONER

## 2025-05-27 PROCEDURE — 99213 OFFICE O/P EST LOW 20 MIN: CPT | Performed by: NURSE PRACTITIONER

## 2025-05-27 PROCEDURE — 1036F TOBACCO NON-USER: CPT | Performed by: NURSE PRACTITIONER

## 2025-05-27 PROCEDURE — 3079F DIAST BP 80-89 MM HG: CPT | Performed by: NURSE PRACTITIONER

## 2025-05-27 PROCEDURE — 83036 HEMOGLOBIN GLYCOSYLATED A1C: CPT | Performed by: NURSE PRACTITIONER

## 2025-05-27 PROCEDURE — 3075F SYST BP GE 130 - 139MM HG: CPT | Performed by: NURSE PRACTITIONER

## 2025-05-27 PROCEDURE — G2211 COMPLEX E/M VISIT ADD ON: HCPCS | Performed by: NURSE PRACTITIONER

## 2025-05-27 ASSESSMENT — ENCOUNTER SYMPTOMS
MUSCULOSKELETAL NEGATIVE: 1
RESPIRATORY NEGATIVE: 1
CARDIOVASCULAR NEGATIVE: 1
NEUROLOGICAL NEGATIVE: 1
PSYCHIATRIC NEGATIVE: 1
CONSTITUTIONAL NEGATIVE: 1

## 2025-05-27 NOTE — PROGRESS NOTES
Subjective   Patient ID: Franky Cai is a 74 y.o. male who presents for Follow-up (3 month blood sugar).    HPI Presents today for follow up on blood sugar.   Last Hba1c 7.4% 2/2025.    Has completely reduced added sugars to his diet.   He is juicing with beet or carrot juice and making his own.   Not adding any sugar or sweetener.    Has made additional changes in his diet. Cut out all his sugars.  Is down a few pounds and feeling well.   Is following with urology as noted    Review of Systems   Constitutional: Negative.    Respiratory: Negative.     Cardiovascular: Negative.    Musculoskeletal: Negative.    Neurological: Negative.    Psychiatric/Behavioral: Negative.         Objective   /82 (BP Location: Right arm, Patient Position: Sitting)   Pulse 81   Temp 36.3 °C (97.3 °F) (Temporal)   Wt 90.3 kg (199 lb)   SpO2 97%   BMI 26.99 kg/m²     Physical Exam  Constitutional:       Appearance: Normal appearance.   Pulmonary:      Effort: Pulmonary effort is normal.   Musculoskeletal:         General: Normal range of motion.   Neurological:      General: No focal deficit present.      Mental Status: He is alert.   Psychiatric:         Mood and Affect: Mood normal.         Behavior: Behavior normal.         Assessment/Plan   Problem List Items Addressed This Visit           ICD-10-CM    Elevated fasting blood sugar R73.01    Relevant Orders    POCT glycosylated hemoglobin (Hb A1C) manually resulted (Completed)

## 2025-06-05 ENCOUNTER — APPOINTMENT (OUTPATIENT)
Dept: UROLOGY | Facility: CLINIC | Age: 75
End: 2025-06-05
Payer: MEDICARE

## 2025-06-05 VITALS — TEMPERATURE: 97.6 F

## 2025-06-05 DIAGNOSIS — R97.20 ELEVATED PSA: Primary | ICD-10-CM

## 2025-06-05 DIAGNOSIS — N28.89 RENAL MASS: ICD-10-CM

## 2025-06-05 PROCEDURE — 99214 OFFICE O/P EST MOD 30 MIN: CPT | Performed by: STUDENT IN AN ORGANIZED HEALTH CARE EDUCATION/TRAINING PROGRAM

## 2025-06-05 PROCEDURE — G2211 COMPLEX E/M VISIT ADD ON: HCPCS | Performed by: STUDENT IN AN ORGANIZED HEALTH CARE EDUCATION/TRAINING PROGRAM

## 2025-06-05 PROCEDURE — 1159F MED LIST DOCD IN RCRD: CPT | Performed by: STUDENT IN AN ORGANIZED HEALTH CARE EDUCATION/TRAINING PROGRAM

## 2025-06-05 PROCEDURE — 1126F AMNT PAIN NOTED NONE PRSNT: CPT | Performed by: STUDENT IN AN ORGANIZED HEALTH CARE EDUCATION/TRAINING PROGRAM

## 2025-06-05 ASSESSMENT — PAIN SCALES - GENERAL: PAINLEVEL_OUTOF10: 0-NO PAIN

## 2025-06-05 NOTE — PROGRESS NOTES
HPI:  Proc (2/11/20): prostate biopsy performed by Dr. Arreguin (PSA at time of bx 12.30 in 1/20, then 21.11 in 9/20)  Path: benign prostate tissue     Franky Cai is a 74 y.o. male referred by Dr. Watters for renal lesion and elevated PSA. Hx of ED, HTN, HLD. CT kidney (9/1/23) showed solid enhancing 2 cm lesion upper portion left kidney is compatible with neoplasm, indeterminate subtype, multiple additional renal lesions more likely benign, indolent although some have equivocal findings. MRI Prostate (3/28/25) showed 132g, 2.2 cm and 1.1 cm PI-RADS 3 lesions in the TZ felt most likely to be BPH nodules and an extruded BPH nodule respectively, without evidence of extracapsular disease. CT chest & CT urogram (5/22/25) showed scattered bilateral subcentimeter non calcific pulmonary nodules measuring up to 2 mm, nonspecific and would favor benign, minimal increase in the size of the left upper pole enhancing renal lesion measuring 2.3 x 2.3 cm in comparison to 2.1 x 2.0 cm in 8/31/2023 CT scan highly concerning for renal neoplasm, no ipsilateral renal vascular involvement or enlarged retroperitoneal lymph nodes, bilateral other Bosniak 1 and 2 renal cysts, stable left adrenal adenoma measuring 1.3 cm. Doing well.     4K score: 73.5%, tPSA 25.20 (5/1/25)  PSA: 25.72 (8/9/24), 308.59 (4/28/23)  Cre: 1.38 (8/9/24)    CT kidney (9/1/23): solid enhancing 2 cm lesion upper portion left kidney is compatible with neoplasm, indeterminate subtype, multiple additional renal lesions more likely benign, indolent although some have equivocal findings, 18 x 8 mm oblong probably benign cystic lesion pancreatic head without high-risk features, left adrenal nodule most compatible with adenoma, right adrenal nodule is too small to characterize although most likely incidental.    MRI Prostate (3/28/25): 132g, 2.2 cm and 1.1 cm PI-RADS 3 lesions in the TZ felt most likely to be BPH nodules and an extruded BPH nodule respectively,  without evidence of extracapsular disease.    CT chest & CT urogram (5/22/25): scattered bilateral subcentimeter non calcific pulmonary nodules measuring up to 2 mm, nonspecific and would favor benign, minimal increase in the size of the left upper pole enhancing renal lesion measuring 2.3 x 2.3 cm in comparison to 2.1 x 2.0 cm in 8/31/2023 CT scan highly concerning for renal neoplasm, no ipsilateral renal vascular involvement or enlarged retroperitoneal lymph nodes, bilateral other Bosniak 1 and 2 renal cysts, stable pancreatic head lesion measuring 1.8 cm could represent branch duct IPMN however better to be definitely characterized by dedicated MRCP, stable left adrenal adenoma measuring 1.3 cm    Review of Systems:  All systems reviewed. Anything negative noted in the HPI.    Physical Exam:  Vitals signs reviewed.  Constitutional:      Appearance: Well-developed.  HENT:     Head: Normocephalic and atraumatic.  Neck:     Musculoskeletal: Normal range of motion.  Pulmonary:     Effort: Pulmonary effort is normal.  Musculoskeletal: Normal range of motion.  Skin:     General: Skin is warm and dry.  Neurological:     Mental Status: Alert and oriented to person, place, and time.  Psychiatric:        Behavior: Behavior normal.        Thought Content: Thought content normal.        Judgment: Judgment normal.    Procedures:    Assessment/Plan   Franky Cai is a 74 y.o. male referred by Dr. Watters for renal lesion and elevated PSA. Hx of ED, HTN, HLD. CT kidney (9/1/23) showed solid enhancing 2 cm lesion upper portion left kidney is compatible with neoplasm, indeterminate subtype, multiple additional renal lesions more likely benign, indolent although some have equivocal findings. MRI Prostate (3/28/25) showed 132g, 2.2 cm and 1.1 cm PI-RADS 3 lesions in the TZ felt most likely to be BPH nodules and an extruded BPH nodule respectively, without evidence of extracapsular disease. CT chest & CT urogram (5/22/25) showed  scattered bilateral subcentimeter non calcific pulmonary nodules measuring up to 2 mm, nonspecific and would favor benign, minimal increase in the size of the left upper pole enhancing renal lesion measuring 2.3 x 2.3 cm in comparison to 2.1 x 2.0 cm in 8/31/2023 CT scan highly concerning for renal neoplasm, no ipsilateral renal vascular involvement or enlarged retroperitoneal lymph nodes, bilateral other Bosniak 1 and 2 renal cysts, stable left adrenal adenoma measuring 1.3 cm. Doing well. Management options including risks, benefits and alternatives discussed at length and all questions answered. Patient prefers to proceed with TP prostate biopsy.         Scribe Attestation:  By signing my name below, IJade Scribe   attest that this documentation has been prepared under the direction and in the presence of Doron Urrutia MD.

## 2025-06-09 ENCOUNTER — APPOINTMENT (OUTPATIENT)
Dept: OPHTHALMOLOGY | Age: 75
End: 2025-06-09
Payer: MEDICARE

## 2025-06-09 DIAGNOSIS — R73.01 ELEVATED FASTING BLOOD SUGAR: ICD-10-CM

## 2025-06-09 DIAGNOSIS — H35.011 RETINAL MACROANEURYSM OF RIGHT EYE: Primary | ICD-10-CM

## 2025-06-09 DIAGNOSIS — H52.03 HYPERMETROPIA OF BOTH EYES: ICD-10-CM

## 2025-06-09 DIAGNOSIS — H40.001 GLAUCOMA SUSPECT OF RIGHT EYE: ICD-10-CM

## 2025-06-09 DIAGNOSIS — H43.812 POSTERIOR VITREOUS DETACHMENT OF LEFT EYE: ICD-10-CM

## 2025-06-09 PROCEDURE — 92134 CPTRZ OPH DX IMG PST SGM RTA: CPT | Performed by: OPHTHALMOLOGY

## 2025-06-09 PROCEDURE — 99213 OFFICE O/P EST LOW 20 MIN: CPT | Performed by: OPHTHALMOLOGY

## 2025-06-09 ASSESSMENT — EXTERNAL EXAM - LEFT EYE: OS_EXAM: NORMAL

## 2025-06-09 ASSESSMENT — ENCOUNTER SYMPTOMS
CARDIOVASCULAR NEGATIVE: 1
RESPIRATORY NEGATIVE: 0
MUSCULOSKELETAL NEGATIVE: 0
NEUROLOGICAL NEGATIVE: 0
ENDOCRINE NEGATIVE: 0
PSYCHIATRIC NEGATIVE: 0
ALLERGIC/IMMUNOLOGIC NEGATIVE: 0
CONSTITUTIONAL NEGATIVE: 0
EYES NEGATIVE: 1
GASTROINTESTINAL NEGATIVE: 0
HEMATOLOGIC/LYMPHATIC NEGATIVE: 0

## 2025-06-09 ASSESSMENT — VISUAL ACUITY
CORRECTION_TYPE: GLASSES
OS_CC: 20/25
OD_CC: 20/25
METHOD: SNELLEN - LINEAR
OS_CC+: -2

## 2025-06-09 ASSESSMENT — SLIT LAMP EXAM - LIDS
COMMENTS: GOOD POSITION
COMMENTS: GOOD POSITION

## 2025-06-09 ASSESSMENT — CUP TO DISC RATIO
OD_RATIO: .6
OS_RATIO: .55

## 2025-06-09 ASSESSMENT — PACHYMETRY
OS_CT(UM): 584
OD_CT(UM): 571

## 2025-06-09 ASSESSMENT — TONOMETRY
OD_IOP_MMHG: 21
IOP_METHOD: GOLDMANN APPLANATION
OS_IOP_MMHG: 20

## 2025-06-09 ASSESSMENT — EXTERNAL EXAM - RIGHT EYE: OD_EXAM: NORMAL

## 2025-06-09 NOTE — PROGRESS NOTES
Assessment/Plan   Diagnoses and all orders for this visit:  Retinal macroaneurysm of right eye  -     OCT, Retina - OU - Both Eyes  Posterior vitreous detachment of left eye  Hypermetropia of both eyes  Elevated fasting blood sugar  Glaucoma suspect of right eye       1. Retinal macroneurysm OD etiology unclear      PROCEDURE    FA nov 17 22     Late vascular leakage venous OD temporal OD  more than OS   No optic disc leakage no CME OU      This FA is c/w diffuse peripheral retinal ischemia OD needs laser photocaogulation to prevent NVE OD     Now status post (s/p) panretinal photocoagulation (PRP) 12/9/2022          Hi quality OCT  scans obtained   signal good      OCT OD - Normal Foveal Contour, No Edema, IS/OS Junction Normal   OCT OS - Normal Foveal Contour, No Edema, IS/OS Junction Normal            1m f/u may need more laser PRP      Signs/Symptoms of RD discussed. Pt instructed to call asap if notices increased floaters, decreased VA, or peripheral vision changes.

## 2025-06-13 DIAGNOSIS — R97.20 ELEVATED PSA: Primary | ICD-10-CM

## 2025-06-13 DIAGNOSIS — R52 PAIN, UNSPECIFIED: ICD-10-CM

## 2025-07-09 DIAGNOSIS — R52 PAIN, UNSPECIFIED: ICD-10-CM

## 2025-07-09 DIAGNOSIS — R97.20 ELEVATED PSA: Primary | ICD-10-CM

## 2025-07-15 ENCOUNTER — ANESTHESIA EVENT (OUTPATIENT)
Dept: OPERATING ROOM | Facility: CLINIC | Age: 75
End: 2025-07-15
Payer: MEDICARE

## 2025-07-16 ENCOUNTER — ANESTHESIA (OUTPATIENT)
Dept: OPERATING ROOM | Facility: CLINIC | Age: 75
End: 2025-07-16
Payer: MEDICARE

## 2025-07-16 ENCOUNTER — HOSPITAL ENCOUNTER (OUTPATIENT)
Facility: CLINIC | Age: 75
Setting detail: OUTPATIENT SURGERY
Discharge: HOME | End: 2025-07-16
Attending: STUDENT IN AN ORGANIZED HEALTH CARE EDUCATION/TRAINING PROGRAM | Admitting: STUDENT IN AN ORGANIZED HEALTH CARE EDUCATION/TRAINING PROGRAM
Payer: MEDICARE

## 2025-07-16 VITALS
WEIGHT: 192.68 LBS | DIASTOLIC BLOOD PRESSURE: 75 MMHG | BODY MASS INDEX: 26.98 KG/M2 | RESPIRATION RATE: 16 BRPM | TEMPERATURE: 97 F | OXYGEN SATURATION: 97 % | HEART RATE: 59 BPM | SYSTOLIC BLOOD PRESSURE: 128 MMHG | HEIGHT: 71 IN

## 2025-07-16 DIAGNOSIS — R97.20 ELEVATED PSA: Primary | ICD-10-CM

## 2025-07-16 PROCEDURE — 7100000009 HC PHASE TWO TIME - INITIAL BASE CHARGE: Performed by: STUDENT IN AN ORGANIZED HEALTH CARE EDUCATION/TRAINING PROGRAM

## 2025-07-16 PROCEDURE — 2500000004 HC RX 250 GENERAL PHARMACY W/ HCPCS (ALT 636 FOR OP/ED): Performed by: STUDENT IN AN ORGANIZED HEALTH CARE EDUCATION/TRAINING PROGRAM

## 2025-07-16 PROCEDURE — 2500000005 HC RX 250 GENERAL PHARMACY W/O HCPCS: Performed by: STUDENT IN AN ORGANIZED HEALTH CARE EDUCATION/TRAINING PROGRAM

## 2025-07-16 PROCEDURE — 2720000007 HC OR 272 NO HCPCS: Performed by: STUDENT IN AN ORGANIZED HEALTH CARE EDUCATION/TRAINING PROGRAM

## 2025-07-16 PROCEDURE — 3700000002 HC GENERAL ANESTHESIA TIME - EACH INCREMENTAL 1 MINUTE: Performed by: STUDENT IN AN ORGANIZED HEALTH CARE EDUCATION/TRAINING PROGRAM

## 2025-07-16 PROCEDURE — 88344 IMHCHEM/IMCYTCHM EA MLT ANTB: CPT | Mod: TC,SUR | Performed by: STUDENT IN AN ORGANIZED HEALTH CARE EDUCATION/TRAINING PROGRAM

## 2025-07-16 PROCEDURE — 7100000010 HC PHASE TWO TIME - EACH INCREMENTAL 1 MINUTE: Performed by: STUDENT IN AN ORGANIZED HEALTH CARE EDUCATION/TRAINING PROGRAM

## 2025-07-16 PROCEDURE — 3700000001 HC GENERAL ANESTHESIA TIME - INITIAL BASE CHARGE: Performed by: STUDENT IN AN ORGANIZED HEALTH CARE EDUCATION/TRAINING PROGRAM

## 2025-07-16 PROCEDURE — 55706 BX PRST8 NDL SAT SAMPLING: CPT | Performed by: STUDENT IN AN ORGANIZED HEALTH CARE EDUCATION/TRAINING PROGRAM

## 2025-07-16 PROCEDURE — C1819 TISSUE LOCALIZATION-EXCISION: HCPCS | Performed by: STUDENT IN AN ORGANIZED HEALTH CARE EDUCATION/TRAINING PROGRAM

## 2025-07-16 PROCEDURE — 3600000007 HC OR TIME - EACH INCREMENTAL 1 MINUTE - PROCEDURE LEVEL TWO: Performed by: STUDENT IN AN ORGANIZED HEALTH CARE EDUCATION/TRAINING PROGRAM

## 2025-07-16 PROCEDURE — 3600000002 HC OR TIME - INITIAL BASE CHARGE - PROCEDURE LEVEL TWO: Performed by: STUDENT IN AN ORGANIZED HEALTH CARE EDUCATION/TRAINING PROGRAM

## 2025-07-16 RX ORDER — DEXMEDETOMIDINE HYDROCHLORIDE 100 UG/ML
INJECTION, SOLUTION INTRAVENOUS AS NEEDED
Status: DISCONTINUED | OUTPATIENT
Start: 2025-07-16 | End: 2025-07-16

## 2025-07-16 RX ORDER — ONDANSETRON HYDROCHLORIDE 2 MG/ML
INJECTION, SOLUTION INTRAVENOUS AS NEEDED
Status: DISCONTINUED | OUTPATIENT
Start: 2025-07-16 | End: 2025-07-16

## 2025-07-16 RX ORDER — LIDOCAINE HYDROCHLORIDE 10 MG/ML
0.1 INJECTION, SOLUTION EPIDURAL; INFILTRATION; INTRACAUDAL; PERINEURAL ONCE
Status: DISCONTINUED | OUTPATIENT
Start: 2025-07-16 | End: 2025-07-16 | Stop reason: HOSPADM

## 2025-07-16 RX ORDER — ACETAMINOPHEN 325 MG/1
650 TABLET ORAL EVERY 4 HOURS PRN
Status: DISCONTINUED | OUTPATIENT
Start: 2025-07-16 | End: 2025-07-16 | Stop reason: HOSPADM

## 2025-07-16 RX ORDER — FENTANYL CITRATE 50 UG/ML
INJECTION, SOLUTION INTRAMUSCULAR; INTRAVENOUS AS NEEDED
Status: DISCONTINUED | OUTPATIENT
Start: 2025-07-16 | End: 2025-07-16

## 2025-07-16 RX ORDER — CEFTRIAXONE 2 G/50ML
INJECTION, SOLUTION INTRAVENOUS AS NEEDED
Status: DISCONTINUED | OUTPATIENT
Start: 2025-07-16 | End: 2025-07-16

## 2025-07-16 RX ORDER — SODIUM CHLORIDE 0.9 G/100ML
INJECTION, SOLUTION IRRIGATION AS NEEDED
Status: DISCONTINUED | OUTPATIENT
Start: 2025-07-16 | End: 2025-07-16 | Stop reason: HOSPADM

## 2025-07-16 RX ORDER — KETOROLAC TROMETHAMINE 30 MG/ML
INJECTION, SOLUTION INTRAMUSCULAR; INTRAVENOUS AS NEEDED
Status: DISCONTINUED | OUTPATIENT
Start: 2025-07-16 | End: 2025-07-16

## 2025-07-16 RX ORDER — PHENYLEPHRINE HCL IN 0.9% NACL 1 MG/10 ML
SYRINGE (ML) INTRAVENOUS AS NEEDED
Status: DISCONTINUED | OUTPATIENT
Start: 2025-07-16 | End: 2025-07-16

## 2025-07-16 RX ORDER — SODIUM CHLORIDE, SODIUM LACTATE, POTASSIUM CHLORIDE, CALCIUM CHLORIDE 600; 310; 30; 20 MG/100ML; MG/100ML; MG/100ML; MG/100ML
INJECTION, SOLUTION INTRAVENOUS CONTINUOUS PRN
Status: DISCONTINUED | OUTPATIENT
Start: 2025-07-16 | End: 2025-07-16

## 2025-07-16 RX ORDER — MIDAZOLAM HYDROCHLORIDE 1 MG/ML
INJECTION, SOLUTION INTRAMUSCULAR; INTRAVENOUS AS NEEDED
Status: DISCONTINUED | OUTPATIENT
Start: 2025-07-16 | End: 2025-07-16

## 2025-07-16 RX ORDER — PROPOFOL 10 MG/ML
INJECTION, EMULSION INTRAVENOUS CONTINUOUS PRN
Status: DISCONTINUED | OUTPATIENT
Start: 2025-07-16 | End: 2025-07-16

## 2025-07-16 RX ORDER — ONDANSETRON HYDROCHLORIDE 2 MG/ML
4 INJECTION, SOLUTION INTRAVENOUS ONCE AS NEEDED
Status: DISCONTINUED | OUTPATIENT
Start: 2025-07-16 | End: 2025-07-16 | Stop reason: HOSPADM

## 2025-07-16 RX ORDER — BUPIVACAINE HYDROCHLORIDE 2.5 MG/ML
INJECTION, SOLUTION INFILTRATION; PERINEURAL AS NEEDED
Status: DISCONTINUED | OUTPATIENT
Start: 2025-07-16 | End: 2025-07-16 | Stop reason: HOSPADM

## 2025-07-16 RX ADMIN — KETOROLAC TROMETHAMINE 15 MG: 30 INJECTION, SOLUTION INTRAMUSCULAR; INTRAVENOUS at 08:38

## 2025-07-16 RX ADMIN — CEFTRIAXONE 2 G: 2 INJECTION, SOLUTION INTRAVENOUS at 08:24

## 2025-07-16 RX ADMIN — Medication 80 MCG: at 09:08

## 2025-07-16 RX ADMIN — Medication 200 MCG: at 09:13

## 2025-07-16 RX ADMIN — Medication 80 MCG: at 09:01

## 2025-07-16 RX ADMIN — MIDAZOLAM 2 MG: 1 INJECTION INTRAMUSCULAR; INTRAVENOUS at 08:24

## 2025-07-16 RX ADMIN — ONDANSETRON 4 MG: 2 INJECTION, SOLUTION INTRAMUSCULAR; INTRAVENOUS at 08:37

## 2025-07-16 RX ADMIN — FENTANYL CITRATE 25 MCG: 50 INJECTION, SOLUTION INTRAMUSCULAR; INTRAVENOUS at 08:27

## 2025-07-16 RX ADMIN — PROPOFOL 150 MCG/KG/MIN: 10 INJECTION, EMULSION INTRAVENOUS at 08:29

## 2025-07-16 RX ADMIN — DEXMEDETOMIDINE HYDROCHLORIDE 15 MCG: 100 INJECTION, SOLUTION INTRAVENOUS at 08:24

## 2025-07-16 RX ADMIN — SODIUM CHLORIDE, POTASSIUM CHLORIDE, SODIUM LACTATE AND CALCIUM CHLORIDE: 600; 310; 30; 20 INJECTION, SOLUTION INTRAVENOUS at 08:24

## 2025-07-16 RX ADMIN — Medication 40 MCG: at 08:54

## 2025-07-16 RX ADMIN — DEXAMETHASONE SODIUM PHOSPHATE 4 MG: 4 INJECTION, SOLUTION INTRAMUSCULAR; INTRAVENOUS at 08:38

## 2025-07-16 ASSESSMENT — PAIN SCALES - GENERAL
PAINLEVEL_OUTOF10: 0 - NO PAIN

## 2025-07-16 ASSESSMENT — COLUMBIA-SUICIDE SEVERITY RATING SCALE - C-SSRS
1. IN THE PAST MONTH, HAVE YOU WISHED YOU WERE DEAD OR WISHED YOU COULD GO TO SLEEP AND NOT WAKE UP?: NO
2. HAVE YOU ACTUALLY HAD ANY THOUGHTS OF KILLING YOURSELF?: NO
6. HAVE YOU EVER DONE ANYTHING, STARTED TO DO ANYTHING, OR PREPARED TO DO ANYTHING TO END YOUR LIFE?: NO

## 2025-07-16 ASSESSMENT — PAIN - FUNCTIONAL ASSESSMENT
PAIN_FUNCTIONAL_ASSESSMENT: 0-10

## 2025-07-16 NOTE — H&P
"History Of Present Illness  Franky Cai is a 74 y.o. male presenting with elevated psa.     Past Medical History  Medical History[1]    Surgical History  Surgical History[2]     Social History  He reports that he quit smoking about 26 years ago. His smoking use included cigarettes. He has never been exposed to tobacco smoke. He has never used smokeless tobacco. He reports current alcohol use of about 1.0 standard drink of alcohol per week. He reports that he does not currently use drugs.    Family History  Family History[3]     Allergies  Patient has no known allergies.    Review of Systems     Physical Exam     Last Recorded Vitals  Blood pressure 147/79, pulse 91, temperature 36.7 °C (98.1 °F), temperature source Temporal, resp. rate 16, height 1.803 m (5' 11\"), weight 87.4 kg (192 lb 10.9 oz), SpO2 97%.    Relevant Results             Assessment & Plan  Elevated PSA      biopsy    I spent  minutes in the professional and overall care of this patient.      Doron Urrutia MD         [1]   Past Medical History:  Diagnosis Date    Acne, unspecified 02/19/2013    Acne    Ankle weakness 05/11/2016    Aortic stenosis due to bicuspid aortic valve 03/07/2024    Arrhythmia     BPH (benign prostatic hyperplasia)     CKD (chronic kidney disease)     Cutaneous abscess of face 02/19/2013    Abscess of face    Fracture of ankle, left, open 01/27/2016    Glaucoma suspect of right eye     Heart murmur, systolic     Hypermetropia of both eyes     Inflammatory disease of prostate, unspecified 02/19/2013    Prostatitis    Irregular heart beat     Other conditions influencing health status 02/19/2013    Infective Myositis    Other conditions influencing health status 02/19/2013    Familial Combined Hyperlipidemia    Paroxysmal supraventricular tachycardia 03/07/2024    Posterior vitreous detachment of left eye     Retinal macroaneurysm of right eye     Rheumatic heart disease, unspecified 07/19/2022    Rheumatic heart disease    " UTI (urinary tract infection) 07/11/2023   [2]   Past Surgical History:  Procedure Laterality Date    ORIF ANKLE FRACTURE Left     RETINAL LASER PROCEDURE Left 12/09/2022    Laser panphotocoagulation   [3]   Family History  Problem Relation Name Age of Onset    Other (malignant neoplasm cervix uteri) Mother      Other (cardiac disorder) Father      Hypertension Father

## 2025-07-16 NOTE — ANESTHESIA PREPROCEDURE EVALUATION
Patient: Franky Cai    Procedure Information       Date/Time: 07/16/25 0818    Procedure: Biopsy Prostate (Bilateral: Pelvis)    [place] [facility] [place] [facility] [place] [facility] [place] [facility] [place] [facility]    Surgeons: Doron Urrutia MD            Relevant Problems   Cardiac   (+) Abnormal EKG   (+) Aortic stenosis due to bicuspid aortic valve   (+) Benign essential hypertension   (+) Hyperlipemia   (+) Paroxysmal supraventricular tachycardia   (+) Supraventricular tachycardia       Clinical information reviewed:   Tobacco  Allergies  Meds   Med Hx  Surg Hx   Fam Hx  Soc Hx        NPO Detail:  NPO/Void Status  Carbohydrate Drink Given Prior to Surgery? : N  Date of Last Liquid: 07/16/25  Time of Last Liquid: 0510  Date of Last Solid: 07/16/25  Time of Last Solid: 1930  Last Intake Type: Clear fluids  Time of Last Void: 0530         Physical Exam    Airway  Mallampati: II  TM distance: >3 FB  Neck ROM: full  Mouth opening: 3 or more finger widths     Cardiovascular - normal exam   Dental - normal exam     Pulmonary - normal exam   Abdominal - normal exam           Anesthesia Plan    History of general anesthesia?: yes  History of complications of general anesthesia?: no    ASA 3     MAC     intravenous induction   Anesthetic plan and risks discussed with patient.    Plan discussed with CRNA and CAA.

## 2025-07-16 NOTE — DISCHARGE INSTRUCTIONS
Dr. Urrutia - [facility]  Phone: [phone]    Follow-Up Information    Following your Prostate Biopsy, please follow up with Dr. Urrutia as scheduled    Medication  Please take the medications as prescribed by your doctor. Tylenol or non-steroids! anti-inflammatory medications (such as Aleve®) should relieve mild pain and discomfort. Resume the usual medications you took before surgery unless instructed otherwise.    Resuming Activities and Driving  *NO Driving on the day of surgery  *You may resume driving the day after surgery  *You may resume normal activities as tolerated  *You may shower     Diet  You may resume your normal diet once at home, with no additional considerations.    Expected Signs and Symptoms  You may have a small amount of bleeding with urination on occasion. This may be accompanied with small blood clots. This is normal and should be relieved by increasing your fluid intake.  You may experience some mild burning and discomfort during urination. This is normal and should subside in one to two weeks.      When to Call Your Doctor - Dr. Urrutia [phone]  Please call the office immediately if any of the following symptoms appear:    *Inability to eat, drink, or take medication  *Persistent Nausea or Vomiting  *Fever over 100.4 degrees F. (38 degrees C.)    Please call 9-1-1 if you experience any of the following:  *Chest Pain, Shortness of Breath or Difficulty Breathing  *Sudden one sided weakness/slurred speech/ any signs or symptoms of a stroke  *Severe headache or visual disturbance    Additional Home-Going Instructions for Adults Who Have Had Anesthesia or Sedatives:    The anesthetics, sedatives and pain killers which were given to you will be acting in your body for the next 24 hours.  This may cause you to feel sleepy.  This feeling will slowly wear off.    For the next 24 hours you SHOULD NOT:  *Drive a car, or operate machinery or power tools  *Drink any form of alcohol  (including beer or wine)  *Make any important Decisions

## 2025-07-16 NOTE — ANESTHESIA POSTPROCEDURE EVALUATION
Patient: Franky Cai    Procedure Summary       Date: 07/16/25 Room / [place]: [facility] [place] [facility] / [facility] [place] [facility]    Anesthesia Start: 0824 Anesthesia Stop: 0915    Procedure: Biopsy Prostate (Bilateral: Pelvis) Diagnosis:       Elevated PSA      (Elevated PSA [R97.20])    Surgeons: Doron Urrutia MD Responsible Provider: Jose Robbins MD    Anesthesia Type: MAC ASA Status: 3            Anesthesia Type: MAC    Vitals Value Taken Time   BP 94/63 07/16/25 09:26   Temp 36.2 °C (97.2 °F) 07/16/25 09:11   Pulse 74 07/16/25 09:26   Resp 14 07/16/25 09:26   SpO2 97 % 07/16/25 09:26       Anesthesia Post Evaluation    Patient location during evaluation: PACU  Patient participation: complete - patient participated  Level of consciousness: awake and alert  Pain management: adequate  Multimodal analgesia pain management approach  Airway patency: patent  Cardiovascular status: acceptable  Respiratory status: acceptable  Hydration status: acceptable  Postoperative Nausea and Vomiting: none        There were no known notable events for this encounter.

## 2025-07-16 NOTE — OP NOTE
Biopsy Prostate (B) Operative Note     Date: 2025  OR Location: [facility]    Name: Franky Cai, : 1950, Age: 74 y.o., MRN: [id number], Sex: male    Diagnosis  Pre-op Diagnosis      * Elevated PSA [R97.20] Post-op Diagnosis     * Elevated PSA [R97.20]     Procedures  Biopsy Prostate  29987 - VT BX PROSTATE STRTCTC SATURATION SAMPLING IMG GID    VT PROSTATE NEEDLE BIOPSY ANY APPROACH [20515]  CHG US TRANSRECTAL [10963]  Surgeons      * Doron Urrutia - Primary    Resident/Fellow/Other Assistant:  Surgeons and Role:  * No surgeons found with a matching role *    Staff:   Circulator: Kellee Willett Person: Patty    Anesthesia Staff: Anesthesiologist: Jose Robbins MD  CRNA: NOELLE Lewis-CRNA  SRNA: Rhina Hatfield RN          Estimated Blood Loss (ml)  5.   Specimen(s) Removed  Removed region of interest and systematic biopsies.   Drain(s)  None.   Implant(s)  None.   Complications  None.   Indications (History)  Elevated PSA.   Findings of Procedure  130g prostate, santy x2, one in median lobe, very challenging to visualize and access particularl given sensitivity of area. >25 cores taken..   Description of Procedure  After administration of anesthesia, a prostate block was performed and transrectal ultrasound. Transperineal biopsies taken from region of interest and systematic template performed using the precision point device.

## 2025-07-23 LAB
LAB AP ASR DISCLAIMER: NORMAL
LAB AP BLOCK FOR ADDITIONAL STUDIES: NORMAL
LABORATORY COMMENT REPORT: NORMAL
PATH REPORT.FINAL DX SPEC: NORMAL
PATH REPORT.GROSS SPEC: NORMAL
PATH REPORT.RELEVANT HX SPEC: NORMAL
PATH REPORT.TOTAL CANCER: NORMAL

## 2025-07-31 ENCOUNTER — APPOINTMENT (OUTPATIENT)
Age: 75
End: 2025-07-31
Payer: MEDICARE

## 2025-08-13 ENCOUNTER — TELEMEDICINE (OUTPATIENT)
Age: 75
End: 2025-08-13
Payer: MEDICARE

## 2025-08-13 DIAGNOSIS — C61 MALIGNANT NEOPLASM OF PROSTATE (MULTI): Primary | ICD-10-CM

## 2025-08-13 PROCEDURE — 99214 OFFICE O/P EST MOD 30 MIN: CPT

## 2025-08-29 ENCOUNTER — APPOINTMENT (OUTPATIENT)
Dept: PRIMARY CARE | Facility: CLINIC | Age: 75
End: 2025-08-29
Payer: MEDICARE

## 2025-08-29 VITALS
OXYGEN SATURATION: 95 % | WEIGHT: 193.6 LBS | BODY MASS INDEX: 27 KG/M2 | TEMPERATURE: 98 F | HEART RATE: 93 BPM | SYSTOLIC BLOOD PRESSURE: 114 MMHG | DIASTOLIC BLOOD PRESSURE: 78 MMHG

## 2025-08-29 DIAGNOSIS — Q23.81 AORTIC STENOSIS DUE TO BICUSPID AORTIC VALVE: ICD-10-CM

## 2025-08-29 DIAGNOSIS — I10 BENIGN ESSENTIAL HYPERTENSION: Primary | ICD-10-CM

## 2025-08-29 DIAGNOSIS — I35.0 AORTIC STENOSIS DUE TO BICUSPID AORTIC VALVE: ICD-10-CM

## 2025-08-29 DIAGNOSIS — E78.2 MIXED HYPERLIPIDEMIA: ICD-10-CM

## 2025-08-29 DIAGNOSIS — R73.03 PREDIABETES: ICD-10-CM

## 2025-08-29 DIAGNOSIS — C61 MALIGNANT NEOPLASM OF PROSTATE (MULTI): ICD-10-CM

## 2025-08-29 DIAGNOSIS — I47.10 PAROXYSMAL SUPRAVENTRICULAR TACHYCARDIA: ICD-10-CM

## 2025-08-29 DIAGNOSIS — N28.89 RENAL MASS: ICD-10-CM

## 2025-08-29 DIAGNOSIS — N18.31 CHRONIC KIDNEY DISEASE, STAGE 3A (MULTI): ICD-10-CM

## 2025-08-29 PROCEDURE — 99214 OFFICE O/P EST MOD 30 MIN: CPT | Performed by: NURSE PRACTITIONER

## 2025-08-29 PROCEDURE — 3078F DIAST BP <80 MM HG: CPT | Performed by: NURSE PRACTITIONER

## 2025-08-29 PROCEDURE — 1160F RVW MEDS BY RX/DR IN RCRD: CPT | Performed by: NURSE PRACTITIONER

## 2025-08-29 PROCEDURE — 3074F SYST BP LT 130 MM HG: CPT | Performed by: NURSE PRACTITIONER

## 2025-08-29 PROCEDURE — 1159F MED LIST DOCD IN RCRD: CPT | Performed by: NURSE PRACTITIONER

## 2025-08-29 PROCEDURE — G2211 COMPLEX E/M VISIT ADD ON: HCPCS | Performed by: NURSE PRACTITIONER

## 2025-08-29 RX ORDER — HYDRALAZINE HYDROCHLORIDE 50 MG/1
50 TABLET, FILM COATED ORAL 2 TIMES DAILY
Qty: 180 TABLET | Refills: 1 | Status: SHIPPED | OUTPATIENT
Start: 2025-08-29

## 2025-08-29 ASSESSMENT — ENCOUNTER SYMPTOMS
SHORTNESS OF BREATH: 0
DIZZINESS: 0
WEAKNESS: 0
FEVER: 0
DIARRHEA: 0
NUMBNESS: 0
NAUSEA: 0
FATIGUE: 0
ABDOMINAL PAIN: 0
COUGH: 0
VOMITING: 0
CHEST TIGHTNESS: 0
PALPITATIONS: 0
CHILLS: 0
HEADACHES: 0

## 2025-08-29 ASSESSMENT — PATIENT HEALTH QUESTIONNAIRE - PHQ9
SUM OF ALL RESPONSES TO PHQ9 QUESTIONS 1 AND 2: 0
1. LITTLE INTEREST OR PLEASURE IN DOING THINGS: NOT AT ALL
2. FEELING DOWN, DEPRESSED OR HOPELESS: NOT AT ALL

## 2025-08-30 PROBLEM — R73.03 PREDIABETES: Status: ACTIVE | Noted: 2025-08-30

## 2025-08-30 PROBLEM — I47.10 SUPRAVENTRICULAR TACHYCARDIA: Status: RESOLVED | Noted: 2023-12-06 | Resolved: 2025-08-30

## 2025-09-23 ENCOUNTER — APPOINTMENT (OUTPATIENT)
Dept: OPHTHALMOLOGY | Age: 75
End: 2025-09-23
Payer: MEDICARE

## 2025-12-03 ENCOUNTER — APPOINTMENT (OUTPATIENT)
Age: 75
End: 2025-12-03
Payer: MEDICARE

## 2025-12-22 ENCOUNTER — APPOINTMENT (OUTPATIENT)
Dept: OPHTHALMOLOGY | Age: 75
End: 2025-12-22
Payer: MEDICARE

## 2026-02-25 ENCOUNTER — APPOINTMENT (OUTPATIENT)
Dept: PRIMARY CARE | Facility: CLINIC | Age: 76
End: 2026-02-25
Payer: MEDICARE

## (undated) DEVICE — TOWEL, SURGICAL, NEURO, O/R, 16 X 26, BLUE, STERILE

## (undated) DEVICE — KIT, NERVE ROOT BLOCK, SPINAL, 22 G X 6 IN

## (undated) DEVICE — GOWN, ASTOUND, XL

## (undated) DEVICE — DRESSING, NON-ADHERENT, TELFA, OUCHLESS, 3 X 8 IN, STERILE

## (undated) DEVICE — LABELS, OR GENERAL, W/MARKER

## (undated) DEVICE — SYRINGE, TOOMEY, IRRIGATION, 70ML

## (undated) DEVICE — GLOVE, SURGICAL, PROTEXIS PI , 7.5, PF, LF

## (undated) DEVICE — COVER, TABLE, 44X90

## (undated) DEVICE — APPLICATOR, CHLORAPREP, W/ORANGE TINT, 26ML

## (undated) DEVICE — SYRINGE, 10 CC, LUER LOCK

## (undated) DEVICE — NEEDLE, BIOPSY, MAX CORE, 18G